# Patient Record
Sex: FEMALE | Race: WHITE | URBAN - METROPOLITAN AREA
[De-identification: names, ages, dates, MRNs, and addresses within clinical notes are randomized per-mention and may not be internally consistent; named-entity substitution may affect disease eponyms.]

---

## 2017-11-02 ENCOUNTER — HOSPITAL ENCOUNTER (OUTPATIENT)
Dept: OTHER | Age: 67
Discharge: HOME | End: 2017-11-02
Attending: INTERNAL MEDICINE | Admitting: INTERNAL MEDICINE

## 2017-11-02 DIAGNOSIS — R52 PAIN: ICD-10-CM

## 2020-02-28 ENCOUNTER — HOSPITAL ENCOUNTER (OUTPATIENT)
Dept: GENERAL RADIOLOGY | Age: 70
Discharge: HOME | End: 2020-02-28
Payer: MEDICARE

## 2020-02-28 ENCOUNTER — HOSPITAL ENCOUNTER (OUTPATIENT)
Age: 70
Discharge: HOME | End: 2020-02-28
Payer: MEDICARE

## 2020-02-28 PROCEDURE — 73090 X-RAY EXAM OF FOREARM: CPT

## 2020-02-28 PROCEDURE — 73110 X-RAY EXAM OF WRIST: CPT

## 2020-07-20 ENCOUNTER — OFFICE VISIT (OUTPATIENT)
Dept: PRIMARY CARE CLINIC | Age: 70
End: 2020-07-20
Payer: MEDICARE

## 2020-07-20 PROCEDURE — 99211 OFF/OP EST MAY X REQ PHY/QHP: CPT | Performed by: NURSE PRACTITIONER

## 2020-07-20 NOTE — PATIENT INSTRUCTIONS
You have received a viral test for COVID-19. Below is education on quarantine per the CDC guidelines. For any symptoms, seek care from your PCP, call [phone] to establish care with a doctor, or go directly to an urgent care or the emergency room. Test results will take 2-7 days and will be sent to you in your [employer] account. If you test positive, you will be contacted via phone. If you test negative, the ONLY communication will be through [address] GO TO [employer] AND SIGN UP FOR [employer]  (LOWER LEFT OF THE HOME PAGE)  No test is 100%. If you have symptoms, you should follow the guidance of quarantine as previously stated. You can still be contagious if you have symptoms. Your [facility] will reach out to you if you have a positive result. They will provide you with a return to work date and note. If you were tested for a pre-op, then you should remain in quarantine until your procedure. How do I know if I need to be in quarantine? If you live in a community where COVID-19 is or might be spreading (currently, that is virtually everywhere in the [place])  Be alert for symptoms. Watch for fever, cough, shortness of breath, or other symptoms of COVID-19.  Take your temperature if symptoms develop.  Practice social distancing. Maintain 6 feet of distance from others and stay out of crowded places.  Follow CDC guidance if symptoms develop. If you feel healthy but:   Recently had close contact with a person with COVID-19 you need to Quarantine:   Stay home until 14 days after your last exposure.  Check your temperature twice a day and watch for symptoms of COVID-19.  If possible, stay away from people who are at higher-risk for getting very sick from COVID-19.   Stay Home and Monitor Your Health if you:   Have been diagnosed with COVID-19, or   Are waiting for test results, or   Have cough, fever, or shortness of breath, or symptoms of COVID-19      When You Can

## 2020-07-25 LAB
SARS-COV-2: NOT DETECTED
SOURCE: NORMAL

## 2021-07-02 ENCOUNTER — HOSPITAL ENCOUNTER (EMERGENCY)
Age: 71
Discharge: HOME | End: 2021-07-02
Attending: STUDENT IN AN ORGANIZED HEALTH CARE EDUCATION/TRAINING PROGRAM
Payer: MEDICARE

## 2021-07-02 VITALS
WEIGHT: 192 LBS | DIASTOLIC BLOOD PRESSURE: 60 MMHG | SYSTOLIC BLOOD PRESSURE: 140 MMHG | RESPIRATION RATE: 15 BRPM | TEMPERATURE: 98.9 F | OXYGEN SATURATION: 99 % | HEIGHT: 63 IN | HEART RATE: 79 BPM | BODY MASS INDEX: 34.02 KG/M2

## 2021-07-02 DIAGNOSIS — R89.2 DRUG LEVEL ABOVE THERAPEUTIC RANGE: Primary | ICD-10-CM

## 2021-07-02 LAB
A/G RATIO: 1.3 (ref 1.1–2.2)
ALBUMIN SERPL-MCNC: 4 G/DL (ref 3.4–5)
ALP BLD-CCNC: 127 U/L (ref 40–129)
ALT SERPL-CCNC: 20 U/L (ref 10–40)
ANION GAP SERPL CALCULATED.3IONS-SCNC: 14 MMOL/L (ref 3–16)
AST SERPL-CCNC: 22 U/L (ref 15–37)
BASE EXCESS VENOUS: 2.7 MMOL/L (ref -3–3)
BASOPHILS ABSOLUTE: 0.1 K/UL (ref 0–0.2)
BASOPHILS RELATIVE PERCENT: 0.8 %
BILIRUB SERPL-MCNC: 0.4 MG/DL (ref 0–1)
BUN BLDV-MCNC: 11 MG/DL (ref 7–20)
CALCIUM SERPL-MCNC: 9.6 MG/DL (ref 8.3–10.6)
CARBOXYHEMOGLOBIN: 2.5 % (ref 0–1.5)
CHLORIDE BLD-SCNC: 98 MMOL/L (ref 99–110)
CO2: 24 MMOL/L (ref 21–32)
CREAT SERPL-MCNC: 1.1 MG/DL (ref 0.6–1.2)
EOSINOPHILS ABSOLUTE: 0.3 K/UL (ref 0–0.6)
EOSINOPHILS RELATIVE PERCENT: 2.6 %
GFR AFRICAN AMERICAN: 59
GFR NON-AFRICAN AMERICAN: 49
GLOBULIN: 3.1 G/DL
GLUCOSE BLD-MCNC: 95 MG/DL (ref 70–99)
HCO3 VENOUS: 28.5 MMOL/L (ref 23–29)
HCT VFR BLD CALC: 40.9 % (ref 36–48)
HEMOGLOBIN, VEN, REDUCED: 16 %
HEMOGLOBIN: 13.7 G/DL (ref 12–16)
LYMPHOCYTES ABSOLUTE: 1.5 K/UL (ref 1–5.1)
LYMPHOCYTES RELATIVE PERCENT: 15.3 %
MAGNESIUM: 2 MG/DL (ref 1.8–2.4)
MCH RBC QN AUTO: 31.2 PG (ref 26–34)
MCHC RBC AUTO-ENTMCNC: 33.5 G/DL (ref 31–36)
MCV RBC AUTO: 93 FL (ref 80–100)
METHEMOGLOBIN VENOUS: 0.1 %
MONOCYTES ABSOLUTE: 0.6 K/UL (ref 0–1.3)
MONOCYTES RELATIVE PERCENT: 5.7 %
NEUTROPHILS ABSOLUTE: 7.3 K/UL (ref 1.7–7.7)
NEUTROPHILS RELATIVE PERCENT: 75.6 %
O2 CONTENT, VEN: 16 VOL %
O2 SAT, VEN: 84 %
O2 THERAPY: ABNORMAL
PCO2, VEN: 47.4 MMHG (ref 40–50)
PDW BLD-RTO: 14.2 % (ref 12.4–15.4)
PH VENOUS: 7.39 (ref 7.35–7.45)
PLATELET # BLD: 355 K/UL (ref 135–450)
PMV BLD AUTO: 8.1 FL (ref 5–10.5)
PO2, VEN: 50 MMHG (ref 25–40)
POTASSIUM REFLEX MAGNESIUM: 3.3 MMOL/L (ref 3.5–5.1)
RBC # BLD: 4.4 M/UL (ref 4–5.2)
SODIUM BLD-SCNC: 136 MMOL/L (ref 136–145)
TCO2 CALC VENOUS: 67 MMOL/L
TOTAL PROTEIN: 7.1 G/DL (ref 6.4–8.2)
TROPONIN: <0.01 NG/ML
WBC # BLD: 9.7 K/UL (ref 4–11)

## 2021-07-02 PROCEDURE — 2500000003 HC RX 250 WO HCPCS: Performed by: STUDENT IN AN ORGANIZED HEALTH CARE EDUCATION/TRAINING PROGRAM

## 2021-07-02 PROCEDURE — 36415 COLL VENOUS BLD VENIPUNCTURE: CPT

## 2021-07-02 PROCEDURE — 84484 ASSAY OF TROPONIN QUANT: CPT

## 2021-07-02 PROCEDURE — 93005 ELECTROCARDIOGRAM TRACING: CPT | Performed by: PHYSICIAN ASSISTANT

## 2021-07-02 PROCEDURE — G0480 DRUG TEST DEF 1-7 CLASSES: HCPCS

## 2021-07-02 PROCEDURE — 99283 EMERGENCY DEPT VISIT LOW MDM: CPT

## 2021-07-02 PROCEDURE — 85025 COMPLETE CBC W/AUTO DIFF WBC: CPT

## 2021-07-02 PROCEDURE — 80053 COMPREHEN METABOLIC PANEL: CPT

## 2021-07-02 PROCEDURE — 83735 ASSAY OF MAGNESIUM: CPT

## 2021-07-02 PROCEDURE — 96374 THER/PROPH/DIAG INJ IV PUSH: CPT

## 2021-07-02 PROCEDURE — 82803 BLOOD GASES ANY COMBINATION: CPT

## 2021-07-02 RX ORDER — LEVOTHYROXINE SODIUM 0.07 MG/1
75 TABLET ORAL DAILY
COMMUNITY
End: 2021-12-21

## 2021-07-02 RX ORDER — POTASSIUM CHLORIDE 1.5 G/1.77G
20 POWDER, FOR SOLUTION ORAL 2 TIMES DAILY
COMMUNITY
End: 2022-02-09

## 2021-07-02 RX ORDER — HYDROCHLOROTHIAZIDE 25 MG/1
25 TABLET ORAL DAILY
COMMUNITY

## 2021-07-02 RX ORDER — M-VIT,TX,IRON,MINS/CALC/FOLIC 27MG-0.4MG
1 TABLET ORAL DAILY
COMMUNITY

## 2021-07-02 RX ORDER — CALCIUM CARBONATE 500(1250)
500 TABLET ORAL DAILY
COMMUNITY
End: 2021-12-21

## 2021-07-02 RX ADMIN — SODIUM BICARBONATE 50 MEQ: 84 INJECTION INTRAVENOUS at 18:02

## 2021-07-02 ASSESSMENT — ENCOUNTER SYMPTOMS
RESPIRATORY NEGATIVE: 1
NAUSEA: 0
PHOTOPHOBIA: 0
SHORTNESS OF BREATH: 0
BACK PAIN: 0
DIARRHEA: 0
VOMITING: 0
COLOR CHANGE: 0
CONSTIPATION: 0
ABDOMINAL PAIN: 0
COUGH: 0
CHEST TIGHTNESS: 0

## 2021-07-02 NOTE — ED NOTES
Bed: 01  Expected date:   Expected time:   Means of arrival:   Comments:  [id number] Elliott RN  07/02/21 1750

## 2021-07-02 NOTE — ED PROVIDER NOTES
I independently performed a history and physical on Pamela Nickerson. All diagnostic, treatment, and disposition decisions were made by myself in conjunction with the advanced practice provider. Briefly, this is a 70 y.o. female here for abnormal laboratory studies. She was called today by her physician for elevated despiramine level which was drawn on 6-28. She is not having any symptoms specifically no chest pain, palpitations, trouble breathing, diaphoresis or feeling faint. She has been taking despiramine for depression since 1992. She denies taking more less than prescribed. On exam pt is resting comfortably  Cardiac RRR, no murmur  Lungs clear bilaterally, no increased work of breathing  Abdomen soft nontender  No calf edema or tenderness  Neuro no drift in extremities     EKG  The Ekg interpreted by me in the absence of a cardiologist shows. normal sinus rhythm with a rate of 81  Axis is   Normal  QTc is  normal  Intervals and Durations are unremarkable. No specific ST-T wave changes appreciated. No evidence of acute ischemia. No prior for comparison     Patient presents for evaluation after incidental finding of elevated TCA level drawn 48 hours ago. Asymptomatic. Hemodynamically stable here. EKG showing no QTC or QRS prolongation. She was immediately placed on telemetry monitoring with 1 amp of bicarb given. Chart review showing TCA level greater than 2 times normal limit. Poison control was called urgently. Per their recommendations, if this was not a true trough level it is likely erroneously high. I did confirm that this is not a true trough level. This level was drawn 12 hours after her last TCA dose, not immediately prior to a TCA dose. I suspect this level is erroneously elevated and poison control agrees that the patient clinically would not present this way if this was a true trough level.   They recommend repeating the level now as this is right before her next dose would be due. She can follow-up with Dr. Susi Pires in 1 week for results and she should not take this medication again. She is agreeable to this plan. She will return to the emergency room for any new or worsening symptoms prior to her follow-up with Dr. Julisa Nelson. The total Critical Care time is 35 minutes which excludes separately billable procedures. Patient Referrals:  Ale Castrejon MD  [address] [address]  [phone]    Schedule an appointment as soon as possible for a visit   For a Re-check in   3-5   days. [facility] Emergency Department  [address] [facility]  [address] [address]  [phone]  Go to   As needed, If symptoms worsen      Discharge Medications:  Discharge Medication List as of 7/2/2021  9:17 PM          FINAL IMPRESSION  1. Drug level above therapeutic range        Blood pressure (!) 140/60, pulse 79, temperature 98.9 °F (37.2 °C), temperature source Oral, resp. rate 15, height 5' 3\" (1.6 m), weight 192 lb (87.1 kg), SpO2 99 %.      For further details of [facility] emergency department encounter, please see documentation by advanced practice provider        Valeriy Wise MD  07/02/21 2061       Valeriy Wise MD  07/02/21 2221

## 2021-07-02 NOTE — ED PROVIDER NOTES
addressed in the HPI. REVIEW OF SYSTEMS    (2-9 systems for level 4, 10 or more for level 5)     Review of Systems   Constitutional: Negative for activity change, appetite change, chills, diaphoresis, fatigue and fever. Eyes: Negative for photophobia and visual disturbance. Respiratory: Negative. Negative for cough, chest tightness and shortness of breath. Cardiovascular: Negative. Negative for chest pain, palpitations and leg swelling. Gastrointestinal: Negative for abdominal pain, constipation, diarrhea, nausea and vomiting. Genitourinary: Negative for decreased urine volume, difficulty urinating, dysuria, flank pain, frequency, hematuria and urgency. Musculoskeletal: Negative for arthralgias, back pain, myalgias, neck pain and neck stiffness. Skin: Negative for color change, pallor, rash and wound. Neurological: Negative for dizziness, tremors, seizures, syncope, facial asymmetry, speech difficulty, weakness, light-headedness, numbness and headaches. Positives and Pertinent negatives as per HPI. Except as noted above in the ROS, all other systems were reviewed and negative. PAST MEDICAL HISTORY   No past medical history on file. SURGICAL HISTORY   No past surgical history on file. CURRENTMEDICATIONS       Previous Medications    CALCIUM CARBONATE (OSCAL) 500 MG TABS TABLET    Take 500 mg by mouth daily    DESIPRAMINE HCL PO    Take by mouth    HYDROCHLOROTHIAZIDE (HYDRODIURIL) 25 MG TABLET    Take 25 mg by mouth daily    LEVOTHYROXINE (SYNTHROID) 75 MCG TABLET    Take 75 mcg by mouth Daily    MULTIPLE VITAMINS-MINERALS (THERAPEUTIC MULTIVITAMIN-MINERALS) TABLET    Take 1 tablet by mouth daily    POTASSIUM CHLORIDE (KLOR-CON) 20 MEQ PACKET    Take 20 mEq by mouth 2 times daily         ALLERGIES     Latex, Bactrim [sulfamethoxazole-trimethoprim], and Sulfa antibiotics    FAMILYHISTORY     No family history on file.        SOCIAL HISTORY       Social History     Tobacco Use  Smoking status: Not on file   Substance Use Topics    Alcohol use: Not on file    Drug use: Not on file       SCREENINGS             PHYSICAL EXAM    (up to 7 for level 4, 8 or more for level 5)     ED Triage Vitals   BP Temp Temp Source Pulse Resp SpO2 Height Weight   07/02/21 1712 07/02/21 1712 07/02/21 1712 07/02/21 1712 07/02/21 1712 07/02/21 1800 07/02/21 1712 07/02/21 1712   (!) 142/66 98.9 °F (37.2 °C) Oral 92 18 97 % 5' 3\" (1.6 m) 192 lb (87.1 kg)       Physical Exam  Constitutional:       General: She is not in acute distress. Appearance: Normal appearance. She is well-developed. She is not ill-appearing, toxic-appearing or diaphoretic. HENT:      Head: Normocephalic and atraumatic. Right Ear: External ear normal.      Left Ear: External ear normal.      Nose: Nose normal. No congestion or rhinorrhea. Mouth/Throat:      Mouth: Mucous membranes are moist.      Pharynx: No oropharyngeal exudate or posterior oropharyngeal erythema. Eyes:      General:         Right eye: No discharge. Left eye: No discharge. Extraocular Movements: Extraocular movements intact. Conjunctiva/sclera: Conjunctivae normal.      Pupils: Pupils are equal, round, and reactive to light. Cardiovascular:      Rate and Rhythm: Normal rate and regular rhythm. Pulses: Normal pulses. Heart sounds: Normal heart sounds. No murmur heard. No friction rub. No gallop. Pulmonary:      Effort: Pulmonary effort is normal. No respiratory distress. Breath sounds: Normal breath sounds. No stridor. No wheezing, rhonchi or rales. Chest:      Chest wall: No tenderness. Abdominal:      General: Abdomen is flat. Bowel sounds are normal. There is no distension. Palpations: Abdomen is soft. There is no mass. Tenderness: There is no abdominal tenderness. There is no right CVA tenderness, left CVA tenderness, guarding or rebound. Hernia: No hernia is present.    Musculoskeletal: General: Normal range of motion. Cervical back: Normal range of motion and neck supple. Skin:     General: Skin is warm and dry. Coloration: Skin is not pale. Findings: No erythema or rash. Neurological:      Mental Status: She is alert and oriented to person, place, and time. GCS: GCS eye subscore is 4. GCS verbal subscore is 5. GCS motor subscore is 6. Cranial Nerves: Cranial nerves are intact. No cranial nerve deficit, dysarthria or facial asymmetry. Sensory: Sensation is intact. No sensory deficit. Motor: Motor function is intact. Coordination: Coordination is intact. Gait: Gait is intact.  Gait normal.   Psychiatric:         Behavior: Behavior normal.         DIAGNOSTIC RESULTS   LABS:    Labs Reviewed   COMPREHENSIVE METABOLIC PANEL W/ REFLEX TO MG FOR LOW K - Abnormal; Notable for the following components:       Result Value    Potassium reflex Magnesium 3.3 (*)     Chloride 98 (*)     GFR Non- 49 (*)     GFR  61 (*)     All other components within normal limits    Narrative:     Performed at:  [facility] [address],  [place]   Phone [phone]   BLOOD GAS, VENOUS - Abnormal; Notable for the following components:    pO2, [place] 50.0 (*)     Carboxyhemoglobin 2.5 (*)     All other components within normal limits    Narrative:     Performed at:  [facility] [address] [place]   Phone [phone]   CBC WITH AUTO DIFFERENTIAL    Narrative:     Performed at:  [facility] [address]   Phone [phone]   TROPONIN    Narrative:     Performed at:  [facility] [address]   Phone [phone]   MAGNESIUM    Narrative:     Performed at:  [facility]  [address] [place] [address] [place]   Phone [phone]   Dianne Rodriguez       When ordered only abnormal lab results are displayed. All other labs were within normal range or not returned as of this dictation. EKG: When ordered, EKG's are interpreted by the Emergency Department Physician in the absence of a cardiologist.  Please see their note for interpretation of EKG. RADIOLOGY:   Non-plain film images such as CT, Ultrasound and MRI are read by the radiologist. Plain radiographic images are visualized and preliminarily interpreted by the ED Provider with the below findings:        Interpretation per the Radiologist below, if available at the time of this note:    No orders to display     No results found. PROCEDURES   Unless otherwise noted below, none     Procedures    CRITICAL CARE TIME   N/A    CONSULTS:  None      EMERGENCY DEPARTMENT COURSE and DIFFERENTIAL DIAGNOSIS/MDM:   Vitals:    Vitals:    07/02/21 1712 07/02/21 1800 07/02/21 1815 07/02/21 1830   BP: (!) 142/66 (!) 147/81 137/63 (!) 140/60   Pulse: 92 80 79 79   Resp: 18 12 16 15   Temp: 98.9 °F (37.2 °C)      TempSrc: Oral      SpO2:  97% 99% 99%   Weight: 192 lb (87.1 kg)      Height: 5' 3\" (1.6 m)          Patient was given the following medications:  Medications   sodium bicarbonate 8.4 % injection 50 mEq (50 mEq Intravenous Given 7/2/21 1802)           Patient is a 72-year-old female who presents to the ED with complaint of abnormal lab level. Patient takes desipramine has been for numerous years. Patient that she is been taking as prescribed. Denies any changes in dosages. Denies any missed or increased dosages. Patient had lab work drawn the other day and had abnormal level of this medication and sent to the ED for further evaluation treatment. There is no evidence of anticholinergic effects upon evaluation. No evidence of serotonin syndrome. Patient denies any complaints. EKG obtained interpreted by attending.   VBG shows normal pH, bicarb and CO2. CBC showed normal white count, hemoglobin and platelets. CMP relatively unremarkable with potassium 3.3. Troponin normal.  Magnesium normal.  Patient denies any complaints. Discussed with poison control. Poison control spoke with  on staff and called back.  states that this lab level is normally drawn as a trough. Poison control states the lab level should be drawn just prior to the next dose patient is scheduled to take. Patient states she normally takes the dose in the evening. States lab levels drawn around 11:00 in the morning. Patient states she normally takes her dose around 10 PM.  According to this patient had low level drawn approximately 12 hours after taking her medication when it appears to be taken around 23 to 24 hours after medication taken. Falsely elevated lab levels may be due to this. Patient again is asymptomatic at this time. Please control recommended that we could watch her for 6 hours but they states they usually do this after there was an overdose. Patient has had no overdose and has not taken the medication since last night. Patient is approximately 20 hours after her last medication dosage and is asymptomatic at this time. Do not believe any further observation at this time. Believe can be safely discharged home with close outpatient follow-up. Discussed with poison control and we will redraw level here in the emergency department given the fact the patient is approximately 22 hours after her last dosage to get a more accurate result. This lab level will be drawn and she will follow up in outpatient setting. No EKG changes. No complaints at this time. No evidence of anticholinergic effects or serotonin syndrome. Believe can be safely discharged home. Low suspicion for threat to self or others. Low suspicion for overdose. FINAL IMPRESSION      1.  Labor abnormality          DISPOSITION/PLAN   DISPOSITION Discharge - Pending Orders Complete 07/02/2021 07:59:26 PM      PATIENT REFERRED TO:  Juan Bennett MD  [address] [facility] [address]  [phone]    Schedule an appointment as soon as possible for a visit   For a Re-check in   3-5   days.     [facility] Emergency Department  [address]  [address] [facility] [address]  [phone]  Go to   As needed, If symptoms worsen      DISCHARGE MEDICATIONS:  New Prescriptions    No medications on file       DISCONTINUED MEDICATIONS:  Discontinued Medications    No medications on file              (Please note that portions of this note were completed with a voice recognition program.  Efforts were made to edit the dictations but occasionally words are mis-transcribed.)    ELANA Evans (electronically signed)          ELANA Neal  07/02/21 2004

## 2021-07-03 LAB
EKG ATRIAL RATE: 81 BPM
EKG DIAGNOSIS: NORMAL
EKG P AXIS: 63 DEGREES
EKG P-R INTERVAL: 170 MS
EKG Q-T INTERVAL: 398 MS
EKG QRS DURATION: 80 MS
EKG QTC CALCULATION (BAZETT): 462 MS
EKG R AXIS: 3 DEGREES
EKG T AXIS: 22 DEGREES
EKG VENTRICULAR RATE: 81 BPM

## 2021-07-03 PROCEDURE — 93010 ELECTROCARDIOGRAM REPORT: CPT | Performed by: INTERNAL MEDICINE

## 2021-07-06 LAB
DESIPRAMINE: 443 NG/ML (ref 100–300)
IMIPRAMINE + DESIPRAMINE TOTAL: 443 NG/ML (ref 150–300)
IMIPRAMINE: <10 NG/ML

## 2021-12-01 ENCOUNTER — OFFICE VISIT (OUTPATIENT)
Dept: RHEUMATOLOGY | Age: 71
End: 2021-12-01
Payer: MEDICARE

## 2021-12-01 VITALS
WEIGHT: 168 LBS | HEART RATE: 80 BPM | DIASTOLIC BLOOD PRESSURE: 68 MMHG | SYSTOLIC BLOOD PRESSURE: 118 MMHG | BODY MASS INDEX: 29.76 KG/M2

## 2021-12-01 DIAGNOSIS — Z11.59 ENCOUNTER FOR SCREENING FOR OTHER VIRAL DISEASES: ICD-10-CM

## 2021-12-01 DIAGNOSIS — M1A.09X0 IDIOPATHIC CHRONIC GOUT OF MULTIPLE SITES WITHOUT TOPHUS: ICD-10-CM

## 2021-12-01 DIAGNOSIS — R53.83 OTHER FATIGUE: ICD-10-CM

## 2021-12-01 DIAGNOSIS — R76.8 ANTI-RNP ANTIBODIES PRESENT: ICD-10-CM

## 2021-12-01 DIAGNOSIS — M19.90 INFLAMMATORY ARTHRITIS: Primary | ICD-10-CM

## 2021-12-01 PROCEDURE — 99205 OFFICE O/P NEW HI 60 MIN: CPT | Performed by: INTERNAL MEDICINE

## 2021-12-01 RX ORDER — COLCHICINE 0.6 MG/1
0.6 TABLET ORAL DAILY
COMMUNITY
End: 2022-04-12

## 2021-12-01 RX ORDER — SERTRALINE HYDROCHLORIDE 25 MG/1
25 TABLET, FILM COATED ORAL DAILY
COMMUNITY

## 2021-12-01 RX ORDER — PREDNISONE 20 MG/1
20 TABLET ORAL DAILY
COMMUNITY
End: 2021-12-01

## 2021-12-01 RX ORDER — PREDNISONE 1 MG/1
TABLET ORAL
Qty: 70 TABLET | Refills: 1 | Status: SHIPPED | OUTPATIENT
Start: 2021-12-01 | End: 2022-02-09

## 2021-12-01 RX ORDER — POTASSIUM BICARBONATE 25 MEQ/1
25 TABLET, EFFERVESCENT ORAL 2 TIMES DAILY
COMMUNITY
End: 2021-12-21

## 2021-12-01 RX ORDER — FLUTICASONE FUROATE 100 UG/1
1 POWDER RESPIRATORY (INHALATION) DAILY
COMMUNITY
Start: 2021-01-19

## 2021-12-01 RX ORDER — ALLOPURINOL 300 MG/1
300 TABLET ORAL DAILY
COMMUNITY

## 2021-12-01 RX ORDER — IBUPROFEN 600 MG/1
600 TABLET ORAL EVERY 6 HOURS PRN
COMMUNITY
Start: 2021-09-03 | End: 2021-12-01

## 2021-12-01 NOTE — PROGRESS NOTES
2021  Patient Name: El Adames  : 1950  Medical Record: [id number]      ASSESSMENT AND PLAN    Assessment/Plan:      ASSESSMENT:    1. Inflammatory arthritis    2. Idiopathic chronic gout of multiple sites without tophus    3. Anti-RNP antibodies present    4. Other fatigue    5. Encounter for screening for other viral diseases         PLAN:     Rosy Smith was seen today for establish care. Diagnoses and all orders for this visit:    Inflammatory arthritis  -     CBC Auto Differential; Future  -     Comprehensive Metabolic Panel; Future  -     C-Reactive Protein; Future  -     Sedimentation Rate; Future  -     Cyclic Citrul Peptide Antibody, IgG; Future  -     Hepatitis B Core Antibody, IgM; Future  -     Hepatitis B Surface Antigen; Future  -     Hepatitis C Antibody; Future  -     TSH WITH REFLEX TO FT4; Future  -     XR HAND LEFT (MIN 3 VIEWS); Future  -     XR HAND RIGHT (MIN 3 VIEWS); Future  -     XR FOOT RIGHT (MIN 3 VIEWS); Future  -     XR FOOT LEFT (MIN 3 VIEWS); Future  -     XR ANKLE RIGHT (MIN 3 VIEWS); Future  -     XR ANKLE LEFT (MIN 3 VIEWS); Future  -     predniSONE (DELTASONE) 5 MG tablet; TAKE 3 TABS DAILY FOR 10 DAYS AND THEN 2 TABS DAILY    Idiopathic chronic gout of multiple sites without tophus  -     CBC Auto Differential; Future  -     Uric Acid; Future    Anti-RNP antibodies present  -     CBC Auto Differential; Future  -     Miscellaneous Sendout 1; Future    Other fatigue  -     TSH WITH REFLEX TO FT4; Future    Encounter for screening for other viral diseases   -     Hepatitis B Surface Antigen; Future    Inflammatory arthritis-history suggestive of inflammatory arthritis, soft tissue swelling on joint exam, ESR 47, RF 11. Most likely rheumatoid arthritis versus psoriatic arthritis.   Also component of osteoarthritis contributing to the joint symptoms  I will do additional work-up to evaluate further for systemic rheumatic disease  I will also obtain bilateral hand, ankle and foot x-rays  I will start prednisone 15 mg daily for 10 days and then decrease to 10 mg daily  DMARD therapy after doing blood work    Gout-possible gout. No tophi on exam.  Had a flareup involving the left ankle and right wrist.  No new flareups. Currently on allopurinol 300 mg daily and colchicine 0.6 mg daily. Recommend continuing allopurinol and colchicine  Recheck uric acid, goal uric acid less than 6    Positive AMADOU and positive RNP antibody-positive AMADOU 1: 160 speckled pattern. Positive RNP antibody. dsDNA, anti-Sampson, SSA/SSB, anticentromere, SCL 70 -. No signs and symptoms concerning for lupus  I will repeat RNP antibody [send to [facility]]      The patient indicates understanding of these issues and agrees with the plan. Return in about 8 weeks (around 1/26/2022). The risks and benefits of my recommendations, as well as other treatment options, benefits and side effects werediscussed with the patient. All questions were answered.     I reviewed patient's history, referral documents and electronic medical records  Copy of consult note is being routedelectronically/faxed to referring physician         MEDICATIONS  Current Outpatient Medications   Medication Sig Dispense Refill    fluticasone (ARNUITY ELLIPTA) 100 MCG/ACT AEPB Inhale 1 puff into the lungs daily      colchicine (COLCRYS) 0.6 MG tablet Take 0.6 mg by mouth daily      allopurinol (ZYLOPRIM) 300 MG tablet Take 300 mg by mouth daily      Polyethylene Glycol 3350 (MIRALAX PO) Take by mouth      potassium bicarbonate (K-LYTE) 25 MEQ disintegrating tablet Take 25 mEq by mouth 2 times daily      sertraline (ZOLOFT) 25 MG tablet Take 25 mg by mouth daily      predniSONE (DELTASONE) 5 MG tablet TAKE 3 TABS DAILY FOR 10 DAYS AND THEN 2 TABS DAILY 70 tablet 1    DESIPRAMINE HCL PO Take by mouth      Multiple Vitamins-Minerals (THERAPEUTIC MULTIVITAMIN-MINERALS) tablet Take 1 tablet by mouth daily      calcium carbonate (OSCAL) 500 MG TABS tablet Take 500 mg by mouth daily      levothyroxine (SYNTHROID) 75 MCG tablet Take 75 mcg by mouth Daily      hydroCHLOROthiazide (HYDRODIURIL) 25 MG tablet Take 25 mg by mouth daily      potassium chloride (KLOR-CON) 20 MEQ packet Take 20 mEq by mouth 2 times daily       No current facility-administered medications for this visit. ALLERGIES  Allergies   Allergen Reactions    Latex     Allopurinol Rash    Duloxetine Rash    Bactrim [Sulfamethoxazole-Trimethoprim]     Sulfa Antibiotics          Comments  No specialty comments available. Tayo Burciaga MD    HISTORY OF PRESENT ILLNESS  [facility] Analilia Blackburn is a 70 y.o. female with past medical history of depression, gout, hypertension who is being seen for follow up evaluation of  joint pain. She reports pain in her joints for past several years. Pain travels around from 1 joint to another. Pain is mainly located in her hands, wrists, knees and ankles. She has swelling in the joints mainly located in the knuckles and ankles. She has morning stiffness lasting for 30 minutes. She has difficulty opening doorknobs and jars. She denies dactylitis, enthesitis, tenosynovitis, uveitis, inflammatory back pain or inflammatory bowel disease. She denies family history of rheumatoid arthritis. She denies fever, weight loss or swollen glands. She has tried taking ibuprofen/Aleve as needed in the past with some relief. She had episode of pain, swelling, erythema and tenderness of the left ankle involving the left foot. She was given a course of antibiotics for possible infection. Symptoms lasted for 2 weeks. 1 month later she had another episode involving the right wrist, right hand and right elbow. She had swelling, erythema, pain and tenderness. She went to the [facility] ER. She had right hand, right wrist and right elbow x-ray which showed joint effusion, degenerative changes.   She was given ibuprofen, codeine and steroids. Symptoms lasted for 1 week. She had blood work that showed elevated uric acid of 8.7. She was placed on allopurinol 300 mg daily and colchicine 0.6 mg daily. She has not had any flareups since. She had a work-up by PCP that showed positive AMADOU 1: 160 speckled pattern and positive RNP antibody. dsDNA, anti-Sampson, SSA/SSB, SCL 70 and anticentromere, RF came back negative. She denies malar rash, photosensitivity, nasal ulcers, dry eyes, alopecia, history OF pregnancy complications, blood clots, pleurisy or pericarditis, Raynaud's, sclerodactyly or skin thickening. Data reviewed-ER records were reviewed from September 2021 as mentioned in HPI  HPI  Review of Systems    REVIEW OF SYSTEMS: Positive for fatigue, canker sores, dry mouth, 1 miscarriage  Constitutional: No unanticipated weight loss or fevers. Integumentary: No rash, photosensitivity, malar rash, livedo reticularis, alopecia and Raynaud's symptoms, sclerodactyly, skin tightening  Eyes: negative for visual disturbance and persistent redness, discharge from eyes   ENT: - No tinnitus, loss of hearing, vertigo, or recurrent ear infections.  - No history of nasal ulcers. - No history of dry eyes  Cardiovascular: No history of pericarditis, chest pain or murmur or palpitations  Respiratory: No shortness of breath, cough or history of interstitial lung disease. No history of pleurisy. No history of tuberculosis or atypical infections. Gastrointestinal: No history of heart burn, dysphagia or esophageal dysmotility. No change in bowel habits or any inflammatory bowel disease. Genitourinary: No history of renal disease, miscarriages. Hematologic/Lymphatic: No abnormal bruising or bleeding, blood clots or swollen lymph nodes. Neurological: No history of  seizure or focal weakness.  No history of neuropathies, paresthesias or hyperesthesias, facial droop, diplopia  Psychiatric: No history of bipolar disease  Endocrine: Active Member of Clubs or Organizations: Not on file    Attends Club or Organization Meetings: Not on file    Marital Status: Not on file   Intimate Partner Violence:     Fear of Current or Ex-Partner: Not on file    Emotionally Abused: Not on file    Physically Abused: Not on file    Sexually Abused: Not on file   Housing Stability:     Unable to Pay for Housing in the Last Year: Not on file    Number of [place] in the Last Year: Not on file    Unstable Housing in the Last Year: Not on file     History reviewed. No pertinent family history.       PHYSICAL EXAM   Vitals:    12/01/21 1358   BP: 118/68   Pulse: 80   Weight: 168 lb (76.2 kg)     Physical Exam  Constitutional:  Well developed, well nourished, no acute distress, non-toxic appearance   Musculoskeletal:    RIGHT  Swell  Tender  ROM  LEFT  Swell  Tender  ROM    DIP2  0  0   [place]  0  0  FULL    DIP3  0  0  FULL   0  0  FULL    DIP4  0  0  FULL   0  0  FULL    DIP5  0  0  FULL   0  0  FULL    PIP1  + + FULL   0  0  FULL    PIP2  0  0  FULL   + + FULL    PIP3  0  0  FULL   + + FULL    PIP4  + + FULL   + + FULL    PIP5  + + FULL   + + FULL    MCP1  + + FULL   + + FULL    MCP2  ++ ++ FULL   ++  ++ FULL    MCP3  ++ ++ FULL   ++ ++ FULL    MCP4  + + FULL   + + FULL    MCP5  + + FULL   +  +  FULL    Wrist  0  0  FULL   0  0  FULL    Elbow  0  0  FULL   0  0  FULL    Shouldr  0  0  FULL   0  0  FULL    Hip  0  0  FULL   0  0  FULL    Knee  0  0   crepitus  0  0   crepitus   Ankle  ++ ++ decr   0  0  FULL    MTP1  0  0   hallux valgus  0  0   hallux valgus   MTP2  0  + FULL   0  + FULL    MTP3  0  + FULL   0  + FULL    MTP4  0  + FULL   0  + FULL    MTP5  0  + FULL   0  + FULL    IP1  0  0  FULL   0  0  FULL    IP2  0  0  FULL   0  0  FULL    IP3  0  0  FULL   0  0  FULL    IP4  0  0  FULL   0  0  FULL    IP5  0  0  FULL   0 0 FULL     Squaring and bony enlargement of bilateral CMC joints  Ambulates without assistance, normal gait  Neck: Full ROM, no tenderness,supple   Back- no tenderness. Eyes:  PERRL, extra ocular movements intact, conjunctiva normal   HEENT:  Atraumatic, normocephalic, external ears normal, oropharynx moist, no pharyngeal exudates. Respiratory:  No respiratory distress  GI:  Soft, nondistended, normal bowel sounds, nontender, noorganomegaly, no mass, no rebound, no guarding   :  No costovertebral angle tenderness   Integument:  Well hydrated, no rash or telangiectasias  Lymphatic:  No lymphadenopathy noted   Neurologic:   Alert & oriented x 3, CN 2-12 normal, no focal deficits noted. Sensations Intact. Muscle strength 5/5 proximally and distally in upper and lower extremities.    Psychiatric:  Speech and behavior appropriate           LABS AND IMAGING  Outside data reviewed and in HPI    Lab Results   Component Value Date    WBC 9.7 07/02/2021    RBC 4.40 07/02/2021    HGB 13.7 07/02/2021    HCT 40.9 07/02/2021     07/02/2021    MCV 93.0 07/02/2021    MCH 31.2 07/02/2021    MCHC 33.5 07/02/2021    RDW 14.2 07/02/2021    LYMPHOPCT 15.3 07/02/2021    MONOPCT 5.7 07/02/2021    BASOPCT 0.8 07/02/2021    MONOSABS 0.6 07/02/2021    LYMPHSABS 1.5 07/02/2021    EOSABS 0.3 07/02/2021    BASOSABS 0.1 07/02/2021       Chemistry        Component Value Date/Time     07/02/2021 1740    K 3.3 (L) 07/02/2021 1740    CL 98 (L) 07/02/2021 1740    CO2 24 07/02/2021 1740    BUN 11 07/02/2021 1740    CREATININE 1.1 07/02/2021 1740        Component Value Date/Time    CALCIUM 9.6 07/02/2021 1740    ALKPHOS 127 07/02/2021 1740    AST 22 07/02/2021 1740    ALT 20 07/02/2021 1740    BILITOT 0.4 07/02/2021 1740          Lab Results   Component Value Date    SEDRATE 47 (H) 09/07/2021     No results found for: CRP  Lab Results   Component Value Date    AMADOU POSITIVE 09/07/2021     Lab Results   Component Value Date    RF 11.0 09/07/2021     Lab Results   Component Value Date    AMADOU POSITIVE 09/07/2021    ANATITER 1:160 09/07/2021     No results found for: DSDNAG, DSDNAIGGIFA  No results found for: SSAROAB, SSALAAB  No results found for: SMAB, RNPAB  No results found for: CENTABIGG  No results found for: C3, C4, ACE  Lab Results   Component Value Date    VITD25 42.8 10/05/2020     Lab Results   Component Value Date    LABURIC 8.7 (H) 09/07/2021     Lab Results   Component Value Date    CITTOCDO52 1622 (H) 10/05/2020     Lab Results   Component Value Date    TSH 1.46 06/28/2021     Lab Results   Component Value Date    VITD25 42.8 10/05/2020       Radiology:      Time Spent With Patient:  Time spent with patient: 60 minutes  Time spent discussing diagnosis, management, reviewing medical records and treatment plan: > 30 minutes      ######################################################################    I thank you for giving me theopportunity to participate in [facility]. If you have any questions or concerns please feel free to contact me. I look forward to following  Pamela along with you. Electronically signed by: Elyssa Browning MD, MD, 12/1/2021 2:46 PM    Documentation was done using voice recognition dragon software. Every effort was made to ensure accuracy;however, inadvertent unintentional computerized transcription errors may be present.

## 2021-12-09 ENCOUNTER — HOSPITAL ENCOUNTER (OUTPATIENT)
Dept: GENERAL RADIOLOGY | Age: 71
Discharge: HOME | End: 2021-12-09
Payer: MEDICARE

## 2021-12-09 ENCOUNTER — HOSPITAL ENCOUNTER (OUTPATIENT)
Age: 71
Discharge: HOME | End: 2021-12-09
Payer: MEDICARE

## 2021-12-09 DIAGNOSIS — M19.079 OSTEOARTHRITIS OF ANKLE AND FOOT, UNSPECIFIED LATERALITY: ICD-10-CM

## 2021-12-09 DIAGNOSIS — M1A.09X0 IDIOPATHIC CHRONIC GOUT OF MULTIPLE SITES WITHOUT TOPHUS: ICD-10-CM

## 2021-12-09 DIAGNOSIS — R76.8 ANTI-RNP ANTIBODIES PRESENT: ICD-10-CM

## 2021-12-09 DIAGNOSIS — M14.679 CHARCOT'S JOINT OF FOOT, UNSPECIFIED LATERALITY: Primary | ICD-10-CM

## 2021-12-09 DIAGNOSIS — Z11.59 ENCOUNTER FOR SCREENING FOR OTHER VIRAL DISEASES: ICD-10-CM

## 2021-12-09 DIAGNOSIS — R53.83 OTHER FATIGUE: ICD-10-CM

## 2021-12-09 DIAGNOSIS — M19.90 INFLAMMATORY ARTHRITIS: ICD-10-CM

## 2021-12-09 LAB
A/G RATIO: 2.4 (ref 1.1–2.2)
ALBUMIN SERPL-MCNC: 4.7 G/DL (ref 3.4–5)
ALP BLD-CCNC: 104 U/L (ref 40–129)
ALT SERPL-CCNC: 19 U/L (ref 10–40)
ANION GAP SERPL CALCULATED.3IONS-SCNC: 12 MMOL/L (ref 3–16)
AST SERPL-CCNC: 17 U/L (ref 15–37)
BASOPHILS ABSOLUTE: 0.1 K/UL (ref 0–0.2)
BASOPHILS RELATIVE PERCENT: 0.9 %
BILIRUB SERPL-MCNC: 0.5 MG/DL (ref 0–1)
BUN BLDV-MCNC: 18 MG/DL (ref 7–20)
C-REACTIVE PROTEIN: <3 MG/L (ref 0–5.1)
CALCIUM SERPL-MCNC: 9.9 MG/DL (ref 8.3–10.6)
CHLORIDE BLD-SCNC: 99 MMOL/L (ref 99–110)
CHOLESTEROL, TOTAL: 160 MG/DL (ref 0–199)
CO2: 26 MMOL/L (ref 21–32)
CREAT SERPL-MCNC: 0.7 MG/DL (ref 0.6–1.2)
EOSINOPHILS ABSOLUTE: 0.1 K/UL (ref 0–0.6)
EOSINOPHILS RELATIVE PERCENT: 1 %
FOLATE: >20 NG/ML (ref 4.78–24.2)
GFR AFRICAN AMERICAN: >60
GFR NON-AFRICAN AMERICAN: >60
GLUCOSE BLD-MCNC: 82 MG/DL (ref 70–99)
HCT VFR BLD CALC: 39.1 % (ref 36–48)
HDLC SERPL-MCNC: 72 MG/DL (ref 40–60)
HEMOGLOBIN: 12.9 G/DL (ref 12–16)
HEPATITIS B CORE IGM ANTIBODY: NORMAL
HEPATITIS B SURFACE ANTIGEN INTERPRETATION: NORMAL
HEPATITIS C ANTIBODY INTERPRETATION: NORMAL
LDL CHOLESTEROL CALCULATED: 76 MG/DL
LYMPHOCYTES ABSOLUTE: 2.1 K/UL (ref 1–5.1)
LYMPHOCYTES RELATIVE PERCENT: 15.4 %
MCH RBC QN AUTO: 31 PG (ref 26–34)
MCHC RBC AUTO-ENTMCNC: 32.9 G/DL (ref 31–36)
MCV RBC AUTO: 94.2 FL (ref 80–100)
MONOCYTES ABSOLUTE: 1.1 K/UL (ref 0–1.3)
MONOCYTES RELATIVE PERCENT: 7.8 %
NEUTROPHILS ABSOLUTE: 10.1 K/UL (ref 1.7–7.7)
NEUTROPHILS RELATIVE PERCENT: 74.9 %
PDW BLD-RTO: 15.6 % (ref 12.4–15.4)
PLATELET # BLD: 419 K/UL (ref 135–450)
PMV BLD AUTO: 8.3 FL (ref 5–10.5)
POTASSIUM SERPL-SCNC: 3.7 MMOL/L (ref 3.5–5.1)
RBC # BLD: 4.15 M/UL (ref 4–5.2)
SEDIMENTATION RATE, ERYTHROCYTE: 7 MM/HR (ref 0–30)
SODIUM BLD-SCNC: 137 MMOL/L (ref 136–145)
T4 FREE: 2.2 NG/DL (ref 0.9–1.8)
TOTAL PROTEIN: 6.7 G/DL (ref 6.4–8.2)
TRIGL SERPL-MCNC: 62 MG/DL (ref 0–150)
TSH REFLEX FT4: 0.18 UIU/ML (ref 0.27–4.2)
URIC ACID, SERUM: 4.1 MG/DL (ref 2.6–6)
VITAMIN B-12: 726 PG/ML (ref 211–911)
VITAMIN D 25-HYDROXY: 44.6 NG/ML
VLDLC SERPL CALC-MCNC: 12 MG/DL
WBC # BLD: 13.5 K/UL (ref 4–11)

## 2021-12-09 PROCEDURE — 82607 VITAMIN B-12: CPT

## 2021-12-09 PROCEDURE — 85025 COMPLETE CBC W/AUTO DIFF WBC: CPT

## 2021-12-09 PROCEDURE — 80061 LIPID PANEL: CPT

## 2021-12-09 PROCEDURE — 73630 X-RAY EXAM OF FOOT: CPT

## 2021-12-09 PROCEDURE — 84550 ASSAY OF BLOOD/URIC ACID: CPT

## 2021-12-09 PROCEDURE — 86200 CCP ANTIBODY: CPT

## 2021-12-09 PROCEDURE — 36415 COLL VENOUS BLD VENIPUNCTURE: CPT

## 2021-12-09 PROCEDURE — 84439 ASSAY OF FREE THYROXINE: CPT

## 2021-12-09 PROCEDURE — 73610 X-RAY EXAM OF ANKLE: CPT

## 2021-12-09 PROCEDURE — 73130 X-RAY EXAM OF HAND: CPT

## 2021-12-09 PROCEDURE — 85652 RBC SED RATE AUTOMATED: CPT

## 2021-12-09 PROCEDURE — 84443 ASSAY THYROID STIM HORMONE: CPT

## 2021-12-09 PROCEDURE — 82746 ASSAY OF FOLIC ACID SERUM: CPT

## 2021-12-09 PROCEDURE — 86140 C-REACTIVE PROTEIN: CPT

## 2021-12-09 PROCEDURE — 86803 HEPATITIS C AB TEST: CPT

## 2021-12-09 PROCEDURE — 87340 HEPATITIS B SURFACE AG IA: CPT

## 2021-12-09 PROCEDURE — 82306 VITAMIN D 25 HYDROXY: CPT

## 2021-12-09 PROCEDURE — 86705 HEP B CORE ANTIBODY IGM: CPT

## 2021-12-09 PROCEDURE — 86235 NUCLEAR ANTIGEN ANTIBODY: CPT

## 2021-12-09 PROCEDURE — 80053 COMPREHEN METABOLIC PANEL: CPT

## 2021-12-10 LAB
ANTI-RNP IGG: 0.4 AI (ref 0–0.9)
CYCLIC CITRULLINATED PEPTIDE ANTIBODY IGG: <0.5 U/ML (ref 0–2.9)

## 2021-12-21 ENCOUNTER — OFFICE VISIT (OUTPATIENT)
Dept: ORTHOPEDIC SURGERY | Age: 71
End: 2021-12-21
Payer: MEDICARE

## 2021-12-21 VITALS — BODY MASS INDEX: 29.76 KG/M2 | HEIGHT: 63 IN

## 2021-12-21 DIAGNOSIS — M19.079 ANKLE ARTHRITIS: ICD-10-CM

## 2021-12-21 DIAGNOSIS — M20.21 HALLUX RIGIDUS OF RIGHT FOOT: Primary | ICD-10-CM

## 2021-12-21 PROCEDURE — 20600 DRAIN/INJ JOINT/BURSA W/O US: CPT | Performed by: ORTHOPAEDIC SURGERY

## 2021-12-21 PROCEDURE — 99203 OFFICE O/P NEW LOW 30 MIN: CPT | Performed by: ORTHOPAEDIC SURGERY

## 2021-12-21 RX ORDER — LIDOCAINE HYDROCHLORIDE 10 MG/ML
10 INJECTION, SOLUTION INFILTRATION; PERINEURAL ONCE
Status: COMPLETED | OUTPATIENT
Start: 2021-12-21 | End: 2021-12-21

## 2021-12-21 RX ORDER — TRIAMCINOLONE ACETONIDE 40 MG/ML
20 INJECTION, SUSPENSION INTRA-ARTICULAR; INTRAMUSCULAR ONCE
Status: COMPLETED | OUTPATIENT
Start: 2021-12-21 | End: 2021-12-21

## 2021-12-21 RX ADMIN — LIDOCAINE HYDROCHLORIDE 10 MG: 10 INJECTION, SOLUTION INFILTRATION; PERINEURAL at 14:12

## 2021-12-21 RX ADMIN — TRIAMCINOLONE ACETONIDE 20 MG: 40 INJECTION, SUSPENSION INTRA-ARTICULAR; INTRAMUSCULAR at 14:16

## 2021-12-21 NOTE — PROGRESS NOTES
CHIEF COMPLAINT:   1- Right great toe pain/ Hallux rigidus. 2- Right ankle pain/ advanced arthritis. HISTORY:  Ms. Pernell Lilly 70 y.o.  female presents today for consultation request from Lorena Landa MD for evaluation of bilateral R>L great toe and ankle pain which started years ago.  She is complaining of sharp pain 6/10. Pain is increase with standing and walking and shoe wear. Pain is sharp early in the morning with first few steps, dull achy pain by the end of the day. No radiation and no numbness and tingling sensation. No other complaint. No h/o trauma or gout. Past Medical History:   Diagnosis Date    Acute headache     Arthritis     Depression     Gout     Hypertension     Joint pain     Joint swelling     Muscle spasm        Past Surgical History:   Procedure Laterality Date    DENTAL SURGERY  1971    TONSILLECTOMY AND ADENOIDECTOMY  1954       Social History     Socioeconomic History    Marital status:      Spouse name: Not on file    Number of children: Not on file    Years of education: Not on file    Highest education level: Not on file   Occupational History    Not on file   Tobacco Use    Smoking status: Passive Smoke Exposure - Never Smoker    Smokeless tobacco: Never Used    Tobacco comment:  used to smoke   Vaping Use    Vaping Use: Never used   Substance and Sexual Activity    Alcohol use: Yes     Comment: Rarely    Drug use: Never    Sexual activity: Not on file   Other Topics Concern    Not on file   Social History Narrative    Not on file     Social Determinants of Health     Financial Resource Strain:     Difficulty of Paying Living Expenses: Not on file   Food Insecurity:     Worried About Running Out of Food in the Last Year: Not on file    Alexa of Food in the Last Year: Not on file   Transportation Needs:     Lack of Transportation (Medical): Not on file    Lack of Transportation (Non-Medical):  Not on file   Physical Activity:     Days of Exercise per Week: Not on file    Minutes of Exercise per Session: Not on file   Stress:     Feeling of Stress : Not on file   Social Connections:     Frequency of Communication with Friends and Family: Not on file    Frequency of Social Gatherings with Friends and Family: Not on file    Attends Jainism Services: Not on file    Active Member of Clubs or Organizations: Not on file    Attends Club or Organization Meetings: Not on file    Marital Status: Not on file   Intimate Partner Violence:     Fear of Current or Ex-Partner: Not on file    Emotionally Abused: Not on file    Physically Abused: Not on file    Sexually Abused: Not on file   Housing Stability:     Unable to Pay for Housing in the Last Year: Not on file    Number of [place] in the Last Year: Not on file    Unstable Housing in the Last Year: Not on file       History reviewed. No pertinent family history. Current Outpatient Medications on File Prior to Visit   Medication Sig Dispense Refill    fluticasone (ARNUITY ELLIPTA) 100 MCG/ACT AEPB Inhale 1 puff into the lungs daily      colchicine (COLCRYS) 0.6 MG tablet Take 0.6 mg by mouth daily      allopurinol (ZYLOPRIM) 300 MG tablet Take 300 mg by mouth daily      Polyethylene Glycol 3350 (MIRALAX PO) Take by mouth      sertraline (ZOLOFT) 25 MG tablet Take 25 mg by mouth daily      predniSONE (DELTASONE) 5 MG tablet TAKE 3 TABS DAILY FOR 10 DAYS AND THEN 2 TABS DAILY 70 tablet 1    Multiple Vitamins-Minerals (THERAPEUTIC MULTIVITAMIN-MINERALS) tablet Take 1 tablet by mouth daily      hydroCHLOROthiazide (HYDRODIURIL) 25 MG tablet Take 25 mg by mouth daily      potassium chloride (KLOR-CON) 20 MEQ packet Take 20 mEq by mouth 2 times daily       No current facility-administered medications on file prior to visit.        Pertinent items are noted in HPI  Review of systems reviewed from Patient History Form dated on 12/21/2021 and available in the patient's chart under the Media tab. No change noted. PHYSICAL EXAMINATION:  Ms. Trinidad Ortega is a very pleasant 70 y.o.  female who presents today in no acute distress, awake, alert, and oriented. She is well dressed, nourished and  groomed. Patient with normal affect. Height is  5' 3\" (1.6 m), weight is  , Body mass index is 29.76 kg/m². Resting respiratory rate is 16. Examination of the gait, showed that the patient walks heel-toe with a non-antalgic gait and no limp.  Examination of both ankles showing a good range of motion.  She has dorsiflexion to about 5 degrees right ankle, with tenderness medial joint line. She has intact sensation and good pedal pulses.  She has good strength in all four planes, including eversion, and has tenderness on deep palpation over the right great toe MPJ, with prominent dorsal osteophytes.  The ankles are stable to drawer test bilaterally, equally.  Ankle reflex 1+ bilaterally. [facility], 3 views of the bilateral foot and ankle, NWB taken 12/9/2021, and showed no acute fracture. Hallux rigidus with arthritis and dorsal osteophytes. Advanced right ankle arthritis. IMPRESSION:    1- Right great toe pain/ Hallux rigidus. 2- Right ankle pain/ advanced arthritis. PLAN: I discussed with the patient the treatment options including both surgical and non-surgical treatment. We recommended stretching exercises of the calf which was taught to the patient today. She will take NSAIDS. Use stiff sole shoes. I believe she will benefit from cortisone injection right great toe, and she agreed to have. PROCEDURE:    With the patient's permission, and under sterile condition, the right big toe MP joint area was prepared and draped with alcohol and injected with a mixture of 1 ml Kenalog 40mg and 1 ml of 1% lidocaine. The patient tolerated the procedure well.    A band-aid was applied and the patient was advised to ice the big toe for 15-20 minutes to relieve any injection site related pain. She reported a good improvement immediatly after the injection. F/u in 3-4 months with new standing xray, PT if needed. She understands that this may need surgery if the pain did not to resolve. Thank you very much for the kind consultation and allowing me to participate in this patient's care. I will continue to keep you apprised of her progress.         Caroline Lundberg MD

## 2022-02-09 ENCOUNTER — OFFICE VISIT (OUTPATIENT)
Dept: RHEUMATOLOGY | Age: 72
End: 2022-02-09
Payer: MEDICARE

## 2022-02-09 VITALS
DIASTOLIC BLOOD PRESSURE: 66 MMHG | WEIGHT: 170 LBS | SYSTOLIC BLOOD PRESSURE: 138 MMHG | HEART RATE: 104 BPM | BODY MASS INDEX: 30.11 KG/M2

## 2022-02-09 DIAGNOSIS — Z79.899 HIGH RISK MEDICATION USE: ICD-10-CM

## 2022-02-09 DIAGNOSIS — R76.8 POSITIVE ANA (ANTINUCLEAR ANTIBODY): ICD-10-CM

## 2022-02-09 DIAGNOSIS — M15.9 PRIMARY OSTEOARTHRITIS INVOLVING MULTIPLE JOINTS: ICD-10-CM

## 2022-02-09 DIAGNOSIS — M06.00 SERONEGATIVE RHEUMATOID ARTHRITIS (HCC): Primary | ICD-10-CM

## 2022-02-09 DIAGNOSIS — M1A.09X0 IDIOPATHIC CHRONIC GOUT OF MULTIPLE SITES WITHOUT TOPHUS: ICD-10-CM

## 2022-02-09 PROCEDURE — 99214 OFFICE O/P EST MOD 30 MIN: CPT | Performed by: INTERNAL MEDICINE

## 2022-02-09 RX ORDER — POTASSIUM CHLORIDE 1500 MG/1
TABLET, EXTENDED RELEASE ORAL
COMMUNITY
Start: 2021-12-16

## 2022-02-09 RX ORDER — FOLIC ACID 1 MG/1
1 TABLET ORAL DAILY
Qty: 30 TABLET | Refills: 5 | Status: SHIPPED
Start: 2022-02-09 | End: 2022-04-12 | Stop reason: SINTOL

## 2022-02-09 RX ORDER — MELOXICAM 15 MG/1
15 TABLET ORAL DAILY
Qty: 30 TABLET | Refills: 5 | Status: SHIPPED | OUTPATIENT
Start: 2022-02-09 | End: 2022-04-12 | Stop reason: ALTCHOICE

## 2022-02-09 RX ORDER — ASCORBATE CALCIUM/BIOFLAVONOID 1000-200MG
TABLET, EXTENDED RELEASE ORAL
COMMUNITY
Start: 2021-12-23

## 2022-02-09 RX ORDER — LEVOTHYROXINE SODIUM 0.05 MG/1
TABLET ORAL
COMMUNITY
Start: 2021-12-16

## 2022-02-09 RX ORDER — AMLODIPINE BESYLATE 5 MG/1
TABLET ORAL
COMMUNITY
Start: 2022-01-14

## 2022-02-09 NOTE — PATIENT INSTRUCTIONS
Labs every 4 weeks   Start methotrexate 5 tabs once a week and folic acid 1 mg daily   Start meloxicam 15 mg daily

## 2022-02-09 NOTE — PROGRESS NOTES
2022  Patient Name: Sachin Sandoval  : 1950  Medical Record: [id number]      ASSESSMENT AND PLAN    Assessment/Plan:      ASSESSMENT:    1. Seronegative rheumatoid arthritis ([facility] 75.)    2. Primary osteoarthritis involving multiple joints    3. Idiopathic chronic gout of multiple sites without tophus    4. High risk medication use    5. Positive AMADOU (antinuclear antibody)        PLAN:     Rosie Monaco was seen today for follow-up. Diagnoses and all orders for this visit:    Seronegative rheumatoid arthritis ([facility]  -     methotrexate (RHEUMATREX) 2.5 MG chemo tablet; Take 5 tablets once a week. Take all the tablets at the same time  -     folic acid (FOLVITE) 1 MG tablet; Take 1 tablet by mouth daily    Primary osteoarthritis involving multiple joints  -     meloxicam (MOBIC) 15 MG tablet; Take 1 tablet by mouth daily    Idiopathic chronic gout of multiple sites without tophus    High risk medication use  -     ALT; Standing  -     AST; Standing  -     CBC Auto Differential; Standing  -     Creatinine, Serum; Standing    Positive AMADOU (antinuclear antibody)    Seronegative rheumatoid arthritis -history suggestive of inflammatory arthritis, soft tissue swelling on joint exam  RF, CCP negative, hand, foot, ankle x-rays with severe degenerative arthritis  Reports improvement with prednisone  Active synovitis on joint exam  Most likely possibility seronegative rheumatoid arthritis  I will start methotrexate 5 tablets once a week and folic acid 1 mg daily  I will also start meloxicam 15 mg daily for concomitant osteoarthritis. Gout-possible gout. No tophi on exam.    No new flareups of gout. Last uric acid 4.1. Continue allopurinol 300 mg daily and colchicine 0.6 mg daily    Positive AMADOU- positive AMADOU 1: 160 speckled pattern. Repeat RNP antibody negative. dsDNA, anti-Sampson, SSA/SSB, anticentromere, SCL 70 -.   No signs and symptoms concerning for lupus    High risk medication use-  Methotrexate increases the risk of infections, birth defects, liver toxicity, rare lung problems, probable malignancy and bone marrow toxicity and need to check the blood counts every month for first 3 months and then every 2 months and it was discussed in detail with the patient. Alcohol abstinence is advised while taking methotrexate. Labs every 4 weeks  Up-to-date with flu, pneumonia, shingles vaccine      The patient indicates understanding of these issues and agrees with the plan. Return in about 8 weeks (around 4/6/2022). The risks and benefits of my recommendations, as well as other treatment options, benefits and side effects werediscussed with the patient. All questions were answered. I reviewed patient's history, referral documents and electronic medical records  Copy of consult note is being routedelectronically/faxed to referring physician         MEDICATIONS  Current Outpatient Medications   Medication Sig Dispense Refill    amLODIPine (NORVASC) 5 MG tablet       levothyroxine (SYNTHROID) 50 MCG tablet       Multiple Minerals (CALCIUM-MAGNESIUM-ZINC) TABS       KLOR-CON M20 20 MEQ extended release tablet       methotrexate (RHEUMATREX) 2.5 MG chemo tablet Take 5 tablets once a week.   Take all the tablets at the same time 20 tablet 1    folic acid (FOLVITE) 1 MG tablet Take 1 tablet by mouth daily 30 tablet 5    meloxicam (MOBIC) 15 MG tablet Take 1 tablet by mouth daily 30 tablet 5    fluticasone (ARNUITY ELLIPTA) 100 MCG/ACT AEPB Inhale 1 puff into the lungs daily      colchicine (COLCRYS) 0.6 MG tablet Take 0.6 mg by mouth daily      allopurinol (ZYLOPRIM) 300 MG tablet Take 300 mg by mouth daily      sertraline (ZOLOFT) 25 MG tablet Take 25 mg by mouth daily      Multiple Vitamins-Minerals (THERAPEUTIC MULTIVITAMIN-MINERALS) tablet Take 1 tablet by mouth daily      hydroCHLOROthiazide (HYDRODIURIL) 25 MG tablet Take 25 mg by mouth daily       No current facility-administered medications for this visit. ALLERGIES  Allergies   Allergen Reactions    Latex     Duloxetine Rash    Bactrim [Sulfamethoxazole-Trimethoprim]     Sulfa Antibiotics          Comments  No specialty comments available. Background history:  Barbara Carroll is a 70 y.o. female with past medical history of depression, gout, hypertension who is being seen for follow up evaluation of  joint pain. She reports pain in her joints for past several years. Pain travels around from 1 joint to another. Pain is mainly located in her hands, wrists, knees and ankles. She has swelling in the joints mainly located in the knuckles and ankles. She has morning stiffness lasting for 30 minutes. She has difficulty opening doorknobs and jars. She denies dactylitis, enthesitis, tenosynovitis, uveitis, inflammatory back pain or inflammatory bowel disease. She denies family history of rheumatoid arthritis. She denies fever, weight loss or swollen glands. She has tried taking ibuprofen/Aleve as needed in the past with some relief. She had episode of pain, swelling, erythema and tenderness of the left ankle involving the left foot. She was given a course of antibiotics for possible infection. Symptoms lasted for 2 weeks. 1 month later she had another episode involving the right wrist, right hand and right elbow. She had swelling, erythema, pain and tenderness. She went to the [facility] ER. She had right hand, right wrist and right elbow x-ray which showed joint effusion, degenerative changes. She was given ibuprofen, codeine and steroids. Symptoms lasted for 1 week. She had blood work that showed elevated uric acid of 8.7. She was placed on allopurinol 300 mg daily and colchicine 0.6 mg daily. She has not had any flareups since. She had a work-up by PCP that showed positive AMADOU 1: 160 speckled pattern and positive RNP antibody. dsDNA, anti-Sampson, SSA/SSB, SCL 70 and anticentromere, RF came back negative.   She denies malar rash, photosensitivity, nasal ulcers, dry eyes, alopecia, history OF pregnancy complications, blood clots, pleurisy or pericarditis, Raynaud's, sclerodactyly or skin thickening. Data reviewed-ER records were reviewed from September 2021 as mentioned in HPI    Interim history: She presents for a follow-up of inflammatory arthritis, gout and osteoarthritis. She is off of prednisone for past 1 week. She reports significant improvement in joint pain, swelling and stiffness on prednisone. Symptoms are slightly worsened off of prednisone. She also saw foot specialist for severe degenerative arthritis in both feet. She received corticosteroid injection of the first MTP joint with minimal benefit. She denies psoriasis, inflammatory bowel disease, inflammatory back pain, dactylitis, enthesitis, tenosynovitis and uveitis. Blood work showed negative RF, CCP, hepatitis panel. ESR, CRP are both normal.  Hand, foot, ankle x-rays showed severe degenerative changes  She has not had any flareup of gout since last seen. She is on allopurinol 300 mg daily and colchicine 0.6 mg daily  Repeat RNP came back negative. She has positive AMADOU 1: 160 speckled pattern. dsDNA, anti-Sampson, RNP, SSA/SSB are negative    HPI  Review of Systems    REVIEW OF SYSTEMS: Positive for fatigue, canker sores, dry mouth, 1 miscarriage  Constitutional: No unanticipated weight loss or fevers. Integumentary: No rash, photosensitivity, malar rash, livedo reticularis, alopecia and Raynaud's symptoms, sclerodactyly, skin tightening  Eyes: negative for visual disturbance and persistent redness, discharge from eyes   ENT: - No tinnitus, loss of hearing, vertigo, or recurrent ear infections.  - No history of nasal ulcers. - No history of dry eyes  Cardiovascular: No history of pericarditis, chest pain or murmur or palpitations  Respiratory: No shortness of breath, cough or history of interstitial lung disease. No history of pleurisy.  No history of tuberculosis or atypical infections. Gastrointestinal: No history of heart burn, dysphagia or esophageal dysmotility. No change in bowel habits or any inflammatory bowel disease. Genitourinary: No history of renal disease, miscarriages. Hematologic/Lymphatic: No abnormal bruising or bleeding, blood clots or swollen lymph nodes. Neurological: No history of  seizure or focal weakness. No history of neuropathies, paresthesias or hyperesthesias, facial droop, diplopia  Psychiatric: No history of bipolar disease  Endocrine: Denies any polyuria, polydipsia and osteoporosis  Allergic/Immunologic: No nasal congestion or hives. I have reviewed patients Past medical History, Social History and Family History as mentioned in her chart and this remains unchanged fromprevious. Past Medical History:   Diagnosis Date    Acute headache     Arthritis     Depression     Gout     Hypertension     Joint pain     Joint swelling     Muscle spasm      Past Surgical History:   Procedure Laterality Date    DENTAL SURGERY  1971    TONSILLECTOMY AND ADENOIDECTOMY  1954     Social History     Socioeconomic History    Marital status:       Spouse name: Not on file    Number of children: Not on file    Years of education: Not on file    Highest education level: Not on file   Occupational History    Not on file   Tobacco Use    Smoking status: Passive Smoke Exposure - Never Smoker    Smokeless tobacco: Never Used    Tobacco comment:  used to smoke   Vaping Use    Vaping Use: Never used   Substance and Sexual Activity    Alcohol use: Yes     Comment: Rarely    Drug use: Never    Sexual activity: Not on file   Other Topics Concern    Not on file   Social History Narrative    Not on file     Social Determinants of Health     Financial Resource Strain:     Difficulty of Paying Living Expenses: Not on file   Food Insecurity:     Worried About Running Out of Food in the Last Year: Not on file    Ran Out of Food in the Last Year: Not on file   Transportation Needs:     Lack of Transportation (Medical): Not on file    Lack of Transportation (Non-Medical): Not on file   Physical Activity:     Days of Exercise per Week: Not on file    Minutes of Exercise per Session: Not on file   Stress:     Feeling of Stress : Not on file   Social Connections:     Frequency of Communication with Friends and Family: Not on file    Frequency of Social Gatherings with Friends and Family: Not on file    Attends [employer] Services: Not on file    Active Member of [address] [place] [address] [employer] Group or Organizations: Not on file    Attends Club or Organization Meetings: Not on file    Marital Status: Not on file   Intimate Partner Violence:     Fear of Current or Ex-Partner: Not on file    Emotionally Abused: Not on file    Physically Abused: Not on file    Sexually Abused: Not on file   Housing Stability:     Unable to Pay for Housing in the Last Year: Not on file    Number of [place] in the Last Year: Not on file    Unstable Housing in the Last Year: Not on file     History reviewed. No pertinent family history.       PHYSICAL EXAM   Vitals:    02/09/22 1037   BP: 138/66   Pulse: 104   Weight: 170 lb (77.1 kg)     Physical Exam  Constitutional:  Well developed, well nourished, no acute distress, non-toxic appearance   Musculoskeletal:    RIGHT  Swell  Tender  ROM  LEFT  Swell  Tender  ROM    DIP2  0  0   [place]  0  0  FULL    DIP3  0  0  FULL   0  0  FULL    DIP4  0  0  FULL   0  0  FULL    DIP5  0  0  FULL   0  0  FULL    PIP1  0 0 FULL   0  0  FULL    PIP2  0  0  FULL   0 0 FULL    PIP3  0  0  FULL   0 0 FULL    PIP4  0 0 FULL   0 0 FULL    PIP5  0 0 FULL   0 0 FULL    MCP1  0 0 FULL   0 0 FULL    MCP2  + + FULL   + + FULL    MCP3  + + FULL   + + FULL    MCP4  + + FULL   + + FULL    MCP5  0 0 FULL   0 0 FULL    Wrist  0  0  FULL   0  0  FULL    Elbow  0  0  FULL   0  0  FULL    Shouldr  0  0  FULL   0  0  FULL    Hip  0  0  FULL   0  0 FULL    Knee  0  0   crepitus  0  0   crepitus   Ankle  ++ ++ decr/bony change  0  0  FULL    MTP1  0  0   hallux valgus  0  0   hallux valgus   MTP2  0  + FULL   0  + FULL    MTP3  0  + FULL   0  + FULL    MTP4  0  + FULL   0  + FULL    MTP5  0  + FULL   0  + FULL    IP1  0  0  FULL   0  0  FULL    IP2  0  0  FULL   0  0  FULL    IP3  0  0  FULL   0  0  FULL    IP4  0  0  FULL   0  0  FULL    IP5  0  0  FULL   0 0 FULL     Squaring and bony enlargement of bilateral CMC joints  Ambulates without assistance, normal gait  Neck: Full ROM, no tenderness,supple   Back- no tenderness. Eyes:  PERRL, extra ocular movements intact, conjunctiva normal   HEENT:  Atraumatic, normocephalic, external ears normal, oropharynx moist, no pharyngeal exudates. Respiratory:  No respiratory distress  GI:  Soft, nondistended, normal bowel sounds, nontender, noorganomegaly, no mass, no rebound, no guarding   :  No costovertebral angle tenderness   Integument:  Well hydrated, no rash or telangiectasias  Lymphatic:  No lymphadenopathy noted   Neurologic:   Alert & oriented x 3, CN 2-12 normal, no focal deficits noted. Sensations Intact. Muscle strength 5/5 proximally and distally in upper and lower extremities.    Psychiatric:  Speech and behavior appropriate           LABS AND IMAGING  Outside data reviewed and in HPI    Lab Results   Component Value Date    WBC 13.5 12/09/2021    RBC 4.15 12/09/2021    HGB 12.9 12/09/2021    HCT 39.1 12/09/2021     12/09/2021    MCV 94.2 12/09/2021    MCH 31.0 12/09/2021    MCHC 32.9 12/09/2021    RDW 15.6 12/09/2021    LYMPHOPCT 15.4 12/09/2021    MONOPCT 7.8 12/09/2021    BASOPCT 0.9 12/09/2021    MONOSABS 1.1 12/09/2021    LYMPHSABS 2.1 12/09/2021    EOSABS 0.1 12/09/2021    BASOSABS 0.1 12/09/2021       Chemistry        Component Value Date/Time     12/09/2021 0925    K 3.7 12/09/2021 0925    K 3.3 (L) 07/02/2021 1740    CL 99 12/09/2021 0925    CO2 26 12/09/2021 0925    BUN 18 12/09/2021 0925    CREATININE 0.7 12/09/2021 0925        Component Value Date/Time    CALCIUM 9.9 12/09/2021 0925    ALKPHOS 104 12/09/2021 0925    AST 17 12/09/2021 0925    ALT 19 12/09/2021 0925    BILITOT 0.5 12/09/2021 0925          Lab Results   Component Value Date    SEDRATE 7 12/09/2021     Lab Results   Component Value Date    CRP <3.0 12/09/2021     Lab Results   Component Value Date    AMADOU POSITIVE 09/07/2021     Lab Results   Component Value Date    RF 11.0 09/07/2021     Lab Results   Component Value Date    AMADOU POSITIVE 09/07/2021    ANATITER 1:160 09/07/2021     No results found for: DSDNAG, DSDNAIGGIFA  No results found for: SSAROAB, SSALAAB  No results found for: SMAB, RNPAB  No results found for: CENTABIGG  No results found for: C3, C4, ACE  Lab Results   Component Value Date    VITD25 44.6 12/09/2021     Lab Results   Component Value Date    LABURIC 4.1 12/09/2021     Lab Results   Component Value Date    YPGKYDEU83 726 12/09/2021     Lab Results   Component Value Date    TSHFT4 0.18 (L) 12/09/2021    TSH 1.46 06/28/2021     Lab Results   Component Value Date    VITD25 44.6 12/09/2021       Radiology: Bilateral hand, foot, ankle x-rays 12/9/2021  Right hand:       1. Moderate 1st CMC joint osteoarthrosis. 2. No acute fracture or dislocation. 3. Subcortical cystic changes at the ulnar lunate can be seen with ulnocarpal   abutment syndrome. Left hand:       1. Moderate to severe 1st CMC joint osteoarthrosis. 2. No acute fracture or dislocation. Right ankle:       1. Severe degenerative changes of the tibiotalar joint.  Clockwise rotation   of the talar dome with irregularity.  Moderate plantar calcaneal spur. Moderate degenerative changes of the midfoot.  Remote inferolateral malleolus   avulsion fracture fragment. 2. No acute fracture or dislocation. Right foot:       1. Moderate diffuse degenerative changes as detailed above.  Diffuse   osteopenia.  Moderate plantar calcaneal spur.

## 2022-03-07 DIAGNOSIS — Z79.899 HIGH RISK MEDICATION USE: ICD-10-CM

## 2022-03-07 LAB
ALT SERPL-CCNC: 30 U/L (ref 10–40)
AST SERPL-CCNC: 29 U/L (ref 15–37)
BASOPHILS ABSOLUTE: 0 K/UL (ref 0–0.2)
BASOPHILS RELATIVE PERCENT: 0.6 %
CREAT SERPL-MCNC: 1 MG/DL (ref 0.6–1.2)
EOSINOPHILS ABSOLUTE: 0.3 K/UL (ref 0–0.6)
EOSINOPHILS RELATIVE PERCENT: 4.2 %
GFR AFRICAN AMERICAN: >60
GFR NON-AFRICAN AMERICAN: 54
HCT VFR BLD CALC: 36 % (ref 36–48)
HEMOGLOBIN: 11.9 G/DL (ref 12–16)
LYMPHOCYTES ABSOLUTE: 1.3 K/UL (ref 1–5.1)
LYMPHOCYTES RELATIVE PERCENT: 18.8 %
MCH RBC QN AUTO: 32 PG (ref 26–34)
MCHC RBC AUTO-ENTMCNC: 33.1 G/DL (ref 31–36)
MCV RBC AUTO: 96.7 FL (ref 80–100)
MONOCYTES ABSOLUTE: 0.5 K/UL (ref 0–1.3)
MONOCYTES RELATIVE PERCENT: 6.8 %
NEUTROPHILS ABSOLUTE: 4.9 K/UL (ref 1.7–7.7)
NEUTROPHILS RELATIVE PERCENT: 69.6 %
PDW BLD-RTO: 16.2 % (ref 12.4–15.4)
PLATELET # BLD: 367 K/UL (ref 135–450)
PMV BLD AUTO: 8.1 FL (ref 5–10.5)
RBC # BLD: 3.72 M/UL (ref 4–5.2)
WBC # BLD: 7 K/UL (ref 4–11)

## 2022-03-11 ENCOUNTER — TELEPHONE (OUTPATIENT)
Dept: RHEUMATOLOGY | Age: 72
End: 2022-03-11

## 2022-03-11 DIAGNOSIS — M19.90 INFLAMMATORY ARTHRITIS: ICD-10-CM

## 2022-03-11 NOTE — TELEPHONE ENCOUNTER
Patient states since starting the Methotrexate and Meloxicam she has had bad swelling in her legs and headaches. She states her oncologist ordered a doppler US of B/L lower extremities that came back normal/no clots. I advised patient to hold Methotrexate and Meloxicam to see if swelling goes down. I advised patient if swelling or headaches get worse to go to emergency room. Patient saw her oncologist and PCP this week.

## 2022-03-14 RX ORDER — PREDNISONE 1 MG/1
TABLET ORAL
Qty: 70 TABLET | Refills: 1 | OUTPATIENT
Start: 2022-03-14

## 2022-03-22 ENCOUNTER — OFFICE VISIT (OUTPATIENT)
Dept: ORTHOPEDIC SURGERY | Age: 72
End: 2022-03-22
Payer: MEDICARE

## 2022-03-22 VITALS — WEIGHT: 170 LBS | BODY MASS INDEX: 30.12 KG/M2 | HEIGHT: 63 IN

## 2022-03-22 DIAGNOSIS — M20.21 HALLUX RIGIDUS OF RIGHT FOOT: ICD-10-CM

## 2022-03-22 DIAGNOSIS — M19.079 ANKLE ARTHRITIS: Primary | ICD-10-CM

## 2022-03-22 PROCEDURE — 99214 OFFICE O/P EST MOD 30 MIN: CPT | Performed by: ORTHOPAEDIC SURGERY

## 2022-03-22 PROCEDURE — 20610 DRAIN/INJ JOINT/BURSA W/O US: CPT | Performed by: ORTHOPAEDIC SURGERY

## 2022-03-22 RX ORDER — TRIAMCINOLONE ACETONIDE 40 MG/ML
40 INJECTION, SUSPENSION INTRA-ARTICULAR; INTRAMUSCULAR ONCE
Status: COMPLETED | OUTPATIENT
Start: 2022-03-22 | End: 2022-03-22

## 2022-03-22 RX ORDER — LIDOCAINE HYDROCHLORIDE 10 MG/ML
20 INJECTION, SOLUTION INFILTRATION; PERINEURAL ONCE
Status: COMPLETED | OUTPATIENT
Start: 2022-03-22 | End: 2022-03-22

## 2022-03-22 RX ADMIN — TRIAMCINOLONE ACETONIDE 40 MG: 40 INJECTION, SUSPENSION INTRA-ARTICULAR; INTRAMUSCULAR at 13:50

## 2022-03-22 RX ADMIN — LIDOCAINE HYDROCHLORIDE 20 MG: 10 INJECTION, SOLUTION INFILTRATION; PERINEURAL at 13:49

## 2022-03-22 NOTE — PROGRESS NOTES
CHIEF COMPLAINT:   1- Right great toe pain/ Hallux rigidus. 2- Right ankle pain/ advanced arthritis. HISTORY:  Ms. Betsey Robertsing 67 y.o.  female presents today for f/u. She was seen on 12/21/2021 as a consultation request from Dilcia Pryor MD for evaluation of bilateral R>L great toe and ankle pain which started years ago. She had right great toe MPJ cortisone injection on 12/21/2021 with minimal improvement. She is complaining of sharp pain 3/10. Pain is increase with standing and walking and shoe wear. Pain is sharp early in the morning with first few steps, dull achy pain by the end of the day. No radiation and no numbness and tingling sensation. No other complaint. No h/o trauma or gout. Past Medical History:   Diagnosis Date    Acute headache     Arthritis     Depression     Gout     Hypertension     Joint pain     Joint swelling     Muscle spasm        Past Surgical History:   Procedure Laterality Date    DENTAL SURGERY  1971    TONSILLECTOMY AND ADENOIDECTOMY  1954       Social History     Socioeconomic History    Marital status:       Spouse name: Not on file    Number of children: Not on file    Years of education: Not on file    Highest education level: Not on file   Occupational History    Not on file   Tobacco Use    Smoking status: Passive Smoke Exposure - Never Smoker    Smokeless tobacco: Never Used    Tobacco comment:  used to smoke   Vaping Use    Vaping Use: Never used   Substance and Sexual Activity    Alcohol use: Yes     Comment: Rarely    Drug use: Never    Sexual activity: Not on file   Other Topics Concern    Not on file   Social History Narrative    Not on file     Social Determinants of Health     Financial Resource Strain:     Difficulty of Paying Living Expenses: Not on file   Food Insecurity:     Worried About Running Out of Food in the Last Year: Not on file    Alexa of Food in the Last Year: Not on file   Transportation Needs:     (ZYLOPRIM) 300 MG tablet Take 300 mg by mouth daily      sertraline (ZOLOFT) 25 MG tablet Take 25 mg by mouth daily      Multiple Vitamins-Minerals (THERAPEUTIC MULTIVITAMIN-MINERALS) tablet Take 1 tablet by mouth daily      hydroCHLOROthiazide (HYDRODIURIL) 25 MG tablet Take 25 mg by mouth daily       No current facility-administered medications on file prior to visit. Pertinent items are noted in HPI  Review of systems reviewed from Patient History Form dated on 12/21/2021 and available in the patient's chart under the Media tab. No change noted. PHYSICAL EXAMINATION:  Ms. Alejandra Hernandez is a very pleasant 67 y.o.  female who presents today in no acute distress, awake, alert, and oriented. She is well dressed, nourished and  groomed. Patient with normal affect. Height is  5' 3\" (1.6 m), weight is 170 lb (77.1 kg), Body mass index is 30.11 kg/m². Resting respiratory rate is 16. Examination of the gait, showed that the patient walks heel-toe with a non-antalgic gait and no limp.  Examination of both ankles showing a good range of motion.  She has dorsiflexion to about 5 degrees right ankle, with tenderness medial joint line. She has intact sensation and good pedal pulses.  She has good strength in all four planes, including eversion, and has tenderness on deep palpation over the right great toe MPJ, with prominent dorsal osteophytes.  The ankles are stable to drawer test bilaterally, equally.  Ankle reflex 1+ bilaterally. IMAGING:  Waqar Zepeda were reviewed, 3 views of the right foot and ankle, taken today in the office,and showed no acute fracture. Hallux rigidus with arthritis and dorsal osteophytes. Advanced right ankle arthritis. IMPRESSION:    1- Right great toe pain/ Hallux rigidus. 2- Right ankle pain/ advanced arthritis. PLAN: I discussed with the patient the treatment options including both surgical and non-surgical treatment.   We recommended stretching exercises of the calf which was taught to the patient today. She will take NSAIDS. Use stiff sole shoes. I believe she will benefit from cortisone injection right ankle, and she agreed to have. PROCEDURE:    With the patient's permission, and under sterile condition, the right ankle was prepared and draped with alcohol and injected with a mixture of 1 ml Kenalog 40mg and 3 ml of 1% lidocaine through the medial port area. The patient tolerated the procedure well. A band-aid was applied and the patient was advised to ice the ankle for 15-20 minutes to relieve any injection site related pain. She reported a good improvement immediatly after the injection. F/u in 3-4 months, PT if needed. She understands that this may need surgery if the pain did not to resolve.        Bonnie Duvall MD

## 2022-04-12 ENCOUNTER — OFFICE VISIT (OUTPATIENT)
Dept: RHEUMATOLOGY | Age: 72
End: 2022-04-12
Payer: MEDICARE

## 2022-04-12 VITALS — WEIGHT: 167 LBS | SYSTOLIC BLOOD PRESSURE: 124 MMHG | DIASTOLIC BLOOD PRESSURE: 70 MMHG | BODY MASS INDEX: 29.58 KG/M2

## 2022-04-12 DIAGNOSIS — R76.8 POSITIVE ANA (ANTINUCLEAR ANTIBODY): ICD-10-CM

## 2022-04-12 DIAGNOSIS — M15.9 PRIMARY OSTEOARTHRITIS INVOLVING MULTIPLE JOINTS: ICD-10-CM

## 2022-04-12 DIAGNOSIS — M06.00 SERONEGATIVE RHEUMATOID ARTHRITIS (HCC): Primary | ICD-10-CM

## 2022-04-12 DIAGNOSIS — M1A.09X0 IDIOPATHIC CHRONIC GOUT OF MULTIPLE SITES WITHOUT TOPHUS: ICD-10-CM

## 2022-04-12 PROCEDURE — 99214 OFFICE O/P EST MOD 30 MIN: CPT | Performed by: INTERNAL MEDICINE

## 2022-04-12 RX ORDER — HYDROXYCHLOROQUINE SULFATE 200 MG/1
200 TABLET, FILM COATED ORAL 2 TIMES DAILY
Qty: 60 TABLET | Refills: 2 | Status: SHIPPED | OUTPATIENT
Start: 2022-04-12 | End: 2022-07-12

## 2022-04-12 NOTE — PATIENT INSTRUCTIONS
Plaquenil  Aleve or advil as needed   Tylenol 1000 mg every 8 hours, do not exceed 3 grams daily   Continue allopurinol

## 2022-04-12 NOTE — PROGRESS NOTES
2022  Patient Name: Ling Juarez  : 1950  Medical Record: [id number]      ASSESSMENT AND PLAN    Assessment/Plan:      ASSESSMENT:    1. Seronegative rheumatoid arthritis ([facility] [address]    2. Primary osteoarthritis involving multiple joints    3. Idiopathic chronic gout of multiple sites without tophus    4. Positive AMADOU (antinuclear antibody)        PLAN:     John Corral was seen today for follow-up. Diagnoses and all orders for this visit:    Seronegative rheumatoid arthritis ([facility] [address]  -     hydroxychloroquine (PLAQUENIL) 200 MG tablet; Take 1 tablet by mouth 2 times daily    Primary osteoarthritis involving multiple joints    Idiopathic chronic gout of multiple sites without tophus    Positive AMADOU (antinuclear antibody)    Seronegative rheumatoid arthritis -history suggestive of inflammatory arthritis, soft tissue swelling on joint exam  RF, CCP negative, hand, foot, ankle x-rays with severe degenerative arthritis  Reports improvement with prednisone  Mild active synovitis on joint exam  Most likely possibility seronegative rheumatoid arthritis  Did not tolerate methotrexate and meloxicam [headache and leg swelling]  I will start Plaquenil 200 mg twice a day  Plaquenil can cause skin rash, GI issues, visual blurriness and increases the risk of retinal toxicity; recommend baseline eye/retinal exam at 3 to 6 months and then once a year. Primary osteoarthritis involving multiple joints-  Continues to be symptomatic. Not willing to take daily NSAIDs  Advised to take over-the-counter Aleve or Advil, Tylenol as needed    Gout-  No tophi on exam.    No new flareups of gout. Last uric acid 4.1. Continue allopurinol 300 mg daily   Off of colchicine    Positive AMADOU- positive AMADOU 1: 160 speckled pattern. Repeat RNP antibody negative. dsDNA, anti-Sampson, SSA/SSB, anticentromere, SCL 70 -.   No signs and symptoms concerning for lupus      The patient indicates understanding of these issues and agrees with the plan.      Return in about 3 months (around 7/12/2022). The risks and benefits of my recommendations, as well as other treatment options, benefits and side effects werediscussed with the patient. All questions were answered. MEDICATIONS  Current Outpatient Medications   Medication Sig Dispense Refill    hydroxychloroquine (PLAQUENIL) 200 MG tablet Take 1 tablet by mouth 2 times daily 60 tablet 2    amLODIPine (NORVASC) 5 MG tablet       levothyroxine (SYNTHROID) 50 MCG tablet       Multiple Minerals (CALCIUM-MAGNESIUM-ZINC) TABS       KLOR-CON M20 20 MEQ extended release tablet       fluticasone (ARNUITY ELLIPTA) 100 MCG/ACT AEPB Inhale 1 puff into the lungs daily      allopurinol (ZYLOPRIM) 300 MG tablet Take 300 mg by mouth daily      sertraline (ZOLOFT) 25 MG tablet Take 25 mg by mouth daily      Multiple Vitamins-Minerals (THERAPEUTIC MULTIVITAMIN-MINERALS) tablet Take 1 tablet by mouth daily      hydroCHLOROthiazide (HYDRODIURIL) 25 MG tablet Take 25 mg by mouth daily       No current facility-administered medications for this visit. ALLERGIES  Allergies   Allergen Reactions    Latex     Duloxetine Rash    Bactrim [Sulfamethoxazole-Trimethoprim]     Sulfa Antibiotics          Comments  No specialty comments available. Background history:  Roxie Jimenez is a 67 y.o. female with past medical history of depression, gout, hypertension who is being seen for follow up evaluation of  joint pain. She reports pain in her joints for past several years. Pain travels around from 1 joint to another. Pain is mainly located in her hands, wrists, knees and ankles. She has swelling in the joints mainly located in the knuckles and ankles. She has morning stiffness lasting for 30 minutes. She has difficulty opening doorknobs and jars. She denies dactylitis, enthesitis, tenosynovitis, uveitis, inflammatory back pain or inflammatory bowel disease.   She denies family history of rheumatoid arthritis. She denies fever, weight loss or swollen glands. She has tried taking ibuprofen/Aleve as needed in the past with some relief. She had episode of pain, swelling, erythema and tenderness of the left ankle involving the left foot. She was given a course of antibiotics for possible infection. Symptoms lasted for 2 weeks. 1 month later she had another episode involving the right wrist, right hand and right elbow. She had swelling, erythema, pain and tenderness. She went to the [facility] ER. She had right hand, right wrist and right elbow x-ray which showed joint effusion, degenerative changes. She was given ibuprofen, codeine and steroids. Symptoms lasted for 1 week. She had blood work that showed elevated uric acid of 8.7. She was placed on allopurinol 300 mg daily and colchicine 0.6 mg daily. She has not had any flareups since. She had a work-up by PCP that showed positive AMADOU 1: 160 speckled pattern and positive RNP antibody. dsDNA, anti-Sampson, SSA/SSB, SCL 70 and anticentromere, RF came back negative. She denies malar rash, photosensitivity, nasal ulcers, dry eyes, alopecia, history OF pregnancy complications, blood clots, pleurisy or pericarditis, Raynaud's, sclerodactyly or skin thickening. Data reviewed-ER records were reviewed from September 2021 as mentioned in HPI    Interim history: She presents for a follow-up of inflammatory arthritis, gout and osteoarthritis. She did not tolerate methotrexate and meloxicam due to headache and leg swelling. She continues to complain of intermittent pain, swelling and stiffness in the joints mainly in her hands, knees, ankles and feet. She denies psoriasis, inflammatory bowel disease, inflammatory back pain, dactylitis, enthesitis, tenosynovitis and uveitis. Blood work showed negative RF, CCP, hepatitis panel.   ESR, CRP are both normal.  Hand, foot, ankle x-rays showed severe degenerative changes  She has not had any flareup of gout since last seen. She is on allopurinol 300 mg daily. She stopped colchicine 5 weeks ago. Repeat RNP came back negative. She has positive AMADOU 1: 160 speckled pattern. dsDNA, anti-Sampson, RNP, SSA/SSB are negative    HPI  Review of Systems    REVIEW OF SYSTEMS: Positive for fatigue, canker sores, dry mouth, 1 miscarriage  Constitutional: No unanticipated weight loss or fevers. Integumentary: No rash, photosensitivity, malar rash, livedo reticularis, alopecia and Raynaud's symptoms, sclerodactyly, skin tightening  Eyes: negative for visual disturbance and persistent redness, discharge from eyes   ENT: - No tinnitus, loss of hearing, vertigo, or recurrent ear infections.  - No history of nasal ulcers. - No history of dry eyes  Cardiovascular: No history of pericarditis, chest pain or murmur or palpitations  Respiratory: No shortness of breath, cough or history of interstitial lung disease. No history of pleurisy. No history of tuberculosis or atypical infections. Gastrointestinal: No history of heart burn, dysphagia or esophageal dysmotility. No change in bowel habits or any inflammatory bowel disease. Genitourinary: No history of renal disease, miscarriages. Hematologic/Lymphatic: No abnormal bruising or bleeding, blood clots or swollen lymph nodes. Neurological: No history of  seizure or focal weakness. No history of neuropathies, paresthesias or hyperesthesias, facial droop, diplopia  Psychiatric: No history of bipolar disease  Endocrine: Denies any polyuria, polydipsia and osteoporosis  Allergic/Immunologic: No nasal congestion or hives. I have reviewed patients Past medical History, Social History and Family History as mentioned in her chart and this remains unchanged fromprevious.     Past Medical History:   Diagnosis Date    Acute headache     Arthritis     Depression     Gout     Hypertension     Joint pain     Joint swelling     Muscle spasm      Past Surgical History: Procedure Laterality Date    DENTAL SURGERY  1971    TONSILLECTOMY AND ADENOIDECTOMY  1954     Social History     Socioeconomic History    Marital status:      Spouse name: Not on file    Number of children: Not on file    Years of education: Not on file    Highest education level: Not on file   Occupational History    Not on file   Tobacco Use    Smoking status: Passive Smoke Exposure - Never Smoker    Smokeless tobacco: Never Used    Tobacco comment:  used to smoke   Vaping Use    Vaping Use: Never used   Substance and Sexual Activity    Alcohol use: Yes     Comment: Rarely    Drug use: Never    Sexual activity: Not on file   Other Topics Concern    Not on file   Social History Narrative    Not on file     Social Determinants of Health     Financial Resource Strain:     Difficulty of Paying Living Expenses: Not on file   Food Insecurity:     Worried About Running Out of Food in the Last Year: Not on file    Alexa of Food in the Last Year: Not on file   Transportation Needs:     Lack of Transportation (Medical): Not on file    Lack of Transportation (Non-Medical):  Not on file   Physical Activity:     Days of Exercise per Week: Not on file    Minutes of Exercise per Session: Not on file   Stress:     Feeling of Stress : Not on file   Social Connections:     Frequency of Communication with Friends and Family: Not on file    Frequency of Social Gatherings with Friends and Family: Not on file    Attends [employer] Services: Not on file    Active Member of Clubs or Organizations: Not on file    Attends Club or Organization Meetings: Not on file    Marital Status: Not on file   Intimate Partner Violence:     Fear of Current or Ex-Partner: Not on file    Emotionally Abused: Not on file    Physically Abused: Not on file    Sexually Abused: Not on file   Housing Stability:     Unable to Pay for Housing in the Last Year: Not on file    Number of [place] in the Last Year: Not on file    Unstable Housing in the Last Year: Not on file     No family history on file. PHYSICAL EXAM   Vitals:    04/12/22 1025   BP: 124/70   Site: Right Upper Arm   Position: Sitting   Cuff Size: Medium Adult   Weight: 167 lb (75.8 kg)     Physical Exam  Constitutional:  Well developed, well nourished, no acute distress, non-toxic appearance   Musculoskeletal:    RIGHT  Swell  Tender  ROM  LEFT  Swell  Tender  ROM    DIP2  0  0   [place]  0  0  FULL    DIP3  0  0  FULL   0  0  FULL    DIP4  0  0  FULL   0  0  FULL    DIP5  0  0  FULL   0  0  FULL    PIP1  0 0 FULL   0  0  FULL    PIP2  0  0  FULL   0 0 FULL    PIP3  0  0  FULL   0 0 FULL    PIP4  0 0 FULL   0 0 FULL    PIP5  0 0 FULL   0 0 FULL    MCP1  0 0 FULL   0 0 FULL    MCP2  + + FULL   + + FULL    MCP3  + + FULL   + + FULL    MCP4  + + FULL   + + FULL    MCP5  0 0 FULL   0 0 FULL    Wrist  0  0  FULL   0  0  FULL    Elbow  0  0  FULL   0  0  FULL    Shouldr  0  0  FULL   0  0  FULL    Hip  0  0  FULL   0  0  FULL    Knee  0  0   crepitus  0  0   crepitus   Ankle  ++ ++ decr/bony change  0  0  FULL    MTP1  0  0   hallux valgus  0  0   hallux valgus   MTP2  0  + FULL   0  + FULL    MTP3  0  + FULL   0  + FULL    MTP4  0  + FULL   0  + FULL    MTP5  0  + FULL   0  + FULL    IP1  0  0  FULL   0  0  FULL    IP2  0  0  FULL   0  0  FULL    IP3  0  0  FULL   0  0  FULL    IP4  0  0  FULL   0  0  FULL    IP5  0  0  FULL   0 0 FULL     Squaring and bony enlargement of bilateral CMC joints  Ambulates without assistance, normal gait  Neck: Full ROM, no tenderness,supple   Back- no tenderness. Eyes:  PERRL, extra ocular movements intact, conjunctiva normal   HEENT:  Atraumatic, normocephalic, external ears normal, oropharynx moist, no pharyngeal exudates.    Respiratory:  No respiratory distress  GI:  Soft, nondistended, normal bowel sounds, nontender, noorganomegaly, no mass, no rebound, no guarding   :  No costovertebral angle tenderness Integument:  Well hydrated, no rash or telangiectasias  Lymphatic:  No lymphadenopathy noted   Neurologic:   Alert & oriented x 3, CN 2-12 normal, no focal deficits noted. Sensations Intact. Muscle strength 5/5 proximally and distally in upper and lower extremities.    Psychiatric:  Speech and behavior appropriate           LABS AND IMAGING  Outside data reviewed and in HPI    Lab Results   Component Value Date    WBC 7.0 03/07/2022    RBC 3.72 03/07/2022    HGB 11.9 03/07/2022    HCT 36.0 03/07/2022     03/07/2022    MCV 96.7 03/07/2022    MCH 32.0 03/07/2022    MCHC 33.1 03/07/2022    RDW 16.2 03/07/2022    LYMPHOPCT 18.8 03/07/2022    MONOPCT 6.8 03/07/2022    BASOPCT 0.6 03/07/2022    MONOSABS 0.5 03/07/2022    LYMPHSABS 1.3 03/07/2022    EOSABS 0.3 03/07/2022    BASOSABS 0.0 03/07/2022       Chemistry        Component Value Date/Time     12/09/2021 0925    K 3.7 12/09/2021 0925    K 3.3 (L) 07/02/2021 1740    CL 99 12/09/2021 0925    CO2 26 12/09/2021 0925    BUN 18 12/09/2021 0925    CREATININE 1.0 03/07/2022 1022        Component Value Date/Time    CALCIUM 9.9 12/09/2021 0925    ALKPHOS 104 12/09/2021 0925    AST 29 03/07/2022 1022    ALT 30 03/07/2022 1022    BILITOT 0.5 12/09/2021 0925          Lab Results   Component Value Date    SEDRATE 7 12/09/2021     Lab Results   Component Value Date    CRP <3.0 12/09/2021     Lab Results   Component Value Date    AMADOU POSITIVE 09/07/2021     Lab Results   Component Value Date    RF 11.0 09/07/2021     Lab Results   Component Value Date    AMADOU POSITIVE 09/07/2021    ANATITER 1:160 09/07/2021     No results found for: DSDNAG, DSDNAIGGIFA  No results found for: SSAROAB, SSALAAB  No results found for: SMAB, RNPAB  No results found for: CENTABIGG  No results found for: C3, C4, ACE  Lab Results   Component Value Date    VITD25 44.6 12/09/2021     Lab Results   Component Value Date    LABURIC 4.1 12/09/2021     Lab Results   Component Value Date    Romel [address]all[address] 726 12/09/2021     Lab Results   Component Value Date    TSHFT4 0.18 (L) 12/09/2021    TSH 1.46 06/28/2021     Lab Results   Component Value Date    VITD25 44.6 12/09/2021       Radiology: Bilateral hand, foot, ankle x-rays 12/9/2021  Right hand:       1. Moderate 1st CMC joint osteoarthrosis. 2. No acute fracture or dislocation. 3. Subcortical cystic changes at the ulnar lunate can be seen with ulnocarpal   abutment syndrome. Left hand:       1. Moderate to severe 1st CMC joint osteoarthrosis. 2. No acute fracture or dislocation. Right ankle:       1. Severe degenerative changes of the tibiotalar joint.  Clockwise rotation   of the talar dome with irregularity.  Moderate plantar calcaneal spur. Moderate degenerative changes of the midfoot.  Remote inferolateral malleolus   avulsion fracture fragment. 2. No acute fracture or dislocation. Right foot:       1. Moderate diffuse degenerative changes as detailed above.  Diffuse   osteopenia.  Moderate plantar calcaneal spur.  Severe degenerative changes of   the tibiotalar joint.  Findings at the midfoot and TMT joints could represent   early neuropathic/Charcot related changes. 2. No acute fracture or dislocation. Left ankle:       1. Mild-to-moderate degenerative changes as detailed above. 2. No acute fracture or dislocation. Left foot:       1. Mild-to-moderate degenerative changes as detailed above.  Findings at the   midfoot/TMT joints could represent early neuropathic/Charcot related changes. 2. No acute fracture or dislocation.               ######################################################################    I thank you for giving me theopportunity to participate in [facility]. If you have any questions or concerns please feel free to contact me. I look forward to following  Pamela along with you.       Electronically signed by: Michael Painting MD, MD, 4/12/2022 10:48 AM    Documentation was done using voice recognition dragon software. Every effort was made to ensure accuracy;however, inadvertent unintentional computerized transcription errors may be present.

## 2022-05-05 ENCOUNTER — TELEPHONE (OUTPATIENT)
Dept: RHEUMATOLOGY | Age: 72
End: 2022-05-05

## 2022-05-05 NOTE — TELEPHONE ENCOUNTER
Patient called in stating that she has had diarrhea since starting the hydroxychloroquine x 3 weeks ago. She is also taking allopurinol for gout, wants to know if she can take pepto-bismol with those medications? Please advise. Patient would like a call back today.

## 2022-05-09 NOTE — TELEPHONE ENCOUNTER
Patient called back and I gave her message-states she has diarrhea off and on.  She will reduce her HCQ dose to 1 daily and call back on how she is feeling

## 2022-07-12 ENCOUNTER — OFFICE VISIT (OUTPATIENT)
Dept: RHEUMATOLOGY | Age: 72
End: 2022-07-12
Payer: MEDICARE

## 2022-07-12 VITALS — SYSTOLIC BLOOD PRESSURE: 122 MMHG | BODY MASS INDEX: 28.84 KG/M2 | WEIGHT: 162.8 LBS | DIASTOLIC BLOOD PRESSURE: 64 MMHG

## 2022-07-12 DIAGNOSIS — Z79.899 HIGH RISK MEDICATION USE: ICD-10-CM

## 2022-07-12 DIAGNOSIS — R76.8 POSITIVE ANA (ANTINUCLEAR ANTIBODY): ICD-10-CM

## 2022-07-12 DIAGNOSIS — M15.9 PRIMARY OSTEOARTHRITIS INVOLVING MULTIPLE JOINTS: ICD-10-CM

## 2022-07-12 DIAGNOSIS — M06.00 SERONEGATIVE RHEUMATOID ARTHRITIS (HCC): Primary | ICD-10-CM

## 2022-07-12 DIAGNOSIS — M1A.09X0 IDIOPATHIC CHRONIC GOUT OF MULTIPLE SITES WITHOUT TOPHUS: ICD-10-CM

## 2022-07-12 LAB
ALT SERPL-CCNC: 15 U/L (ref 10–40)
AST SERPL-CCNC: 18 U/L (ref 15–37)
BASOPHILS ABSOLUTE: 0.1 K/UL (ref 0–0.2)
BASOPHILS RELATIVE PERCENT: 1.5 %
CREAT SERPL-MCNC: 1 MG/DL (ref 0.6–1.2)
EOSINOPHILS ABSOLUTE: 0.3 K/UL (ref 0–0.6)
EOSINOPHILS RELATIVE PERCENT: 4.6 %
GFR AFRICAN AMERICAN: >60
GFR NON-AFRICAN AMERICAN: 54
HCT VFR BLD CALC: 37.3 % (ref 36–48)
HEMOGLOBIN: 12.5 G/DL (ref 12–16)
LYMPHOCYTES ABSOLUTE: 0.9 K/UL (ref 1–5.1)
LYMPHOCYTES RELATIVE PERCENT: 12.8 %
MCH RBC QN AUTO: 30.9 PG (ref 26–34)
MCHC RBC AUTO-ENTMCNC: 33.4 G/DL (ref 31–36)
MCV RBC AUTO: 92.4 FL (ref 80–100)
MONOCYTES ABSOLUTE: 0.4 K/UL (ref 0–1.3)
MONOCYTES RELATIVE PERCENT: 6 %
NEUTROPHILS ABSOLUTE: 5.5 K/UL (ref 1.7–7.7)
NEUTROPHILS RELATIVE PERCENT: 75.1 %
PDW BLD-RTO: 15.2 % (ref 12.4–15.4)
PLATELET # BLD: 329 K/UL (ref 135–450)
PMV BLD AUTO: 7.9 FL (ref 5–10.5)
RBC # BLD: 4.04 M/UL (ref 4–5.2)
WBC # BLD: 7.3 K/UL (ref 4–11)

## 2022-07-12 PROCEDURE — 1123F ACP DISCUSS/DSCN MKR DOCD: CPT | Performed by: INTERNAL MEDICINE

## 2022-07-12 PROCEDURE — 99214 OFFICE O/P EST MOD 30 MIN: CPT | Performed by: INTERNAL MEDICINE

## 2022-07-12 RX ORDER — LEFLUNOMIDE 20 MG/1
20 TABLET ORAL DAILY
Qty: 30 TABLET | Refills: 1 | Status: SHIPPED | OUTPATIENT
Start: 2022-07-12 | End: 2022-09-07 | Stop reason: SDUPTHER

## 2022-07-12 RX ORDER — POLYETHYLENE GLYCOL 3350 17 G/17G
POWDER, FOR SOLUTION ORAL
COMMUNITY
Start: 2022-06-06

## 2022-07-12 NOTE — PROGRESS NOTES
AMADOU- positive AMADOU 1: 160 speckled pattern. Repeat RNP antibody negative. dsDNA, anti-Sampson, SSA/SSB, anticentromere, SCL 70 -. No signs and symptoms concerning for lupus      The patient indicates understanding of these issues and agrees with the plan. Return in about 8 weeks (around 9/6/2022). The risks and benefits of my recommendations, as well as other treatment options, benefits and side effects werediscussed with the patient. All questions were answered. MEDICATIONS  Current Outpatient Medications   Medication Sig Dispense Refill    bisacodyl (DULCOLAX) 5 MG EC tablet Please follow directions for colonoscopy as per the letter sent to you.  polyethylene glycol (GLYCOLAX) 17 GM/SCOOP powder Please follow directions for colonoscopy as per the letter sent to you.  leflunomide (ARAVA) 20 MG tablet Take 1 tablet by mouth daily 30 tablet 1    amLODIPine (NORVASC) 5 MG tablet       levothyroxine (SYNTHROID) 50 MCG tablet       Multiple Minerals (CALCIUM-MAGNESIUM-ZINC) TABS       KLOR-CON M20 20 MEQ extended release tablet       fluticasone (ARNUITY ELLIPTA) 100 MCG/ACT AEPB Inhale 1 puff into the lungs daily      allopurinol (ZYLOPRIM) 300 MG tablet Take 300 mg by mouth daily      sertraline (ZOLOFT) 25 MG tablet Take 25 mg by mouth daily      Multiple Vitamins-Minerals (THERAPEUTIC MULTIVITAMIN-MINERALS) tablet Take 1 tablet by mouth daily      hydroCHLOROthiazide (HYDRODIURIL) 25 MG tablet Take 25 mg by mouth daily       No current facility-administered medications for this visit. ALLERGIES  Allergies   Allergen Reactions    Latex     Duloxetine Rash    Bactrim [Sulfamethoxazole-Trimethoprim]     Sulfa Antibiotics          Comments  No specialty comments available. Background history:  Saji Monge is a 67 y.o. female with past medical history of depression, gout, hypertension who is being seen for follow up evaluation of  joint pain.   She reports pain in and meloxicam due to headache and leg swelling. She is on Plaquenil 200 mg daily. She had diarrhea on Plaquenil. She started taking half a tablet a day and her diarrhea resolved. She is now back on Plaquenil 1 tablet a day. She continues to complain of intermittent pain, swelling and stiffness in the joints mainly in her hands, knees, ankles and feet. She denies psoriasis, inflammatory bowel disease, inflammatory back pain, dactylitis, enthesitis, tenosynovitis and uveitis. Blood work showed negative RF, CCP, hepatitis panel. ESR, CRP are both normal.  Hand, foot, ankle x-rays showed severe degenerative changes  She has not had any flareup of gout since last seen. She is on allopurinol 300 mg daily. She is off of colchicine. Repeat RNP came back negative. She has positive AMADOU 1: 160 speckled pattern. dsDNA, anti-Sampson, RNP, SSA/SSB are negative    HPI  Review of Systems    REVIEW OF SYSTEMS: Positive for fatigue, canker sores, dry mouth, 1 miscarriage  Constitutional: No unanticipated weight loss or fevers. Integumentary: No rash, photosensitivity, malar rash, livedo reticularis, alopecia and Raynaud's symptoms, sclerodactyly, skin tightening  Eyes: negative for visual disturbance and persistent redness, discharge from eyes   ENT: - No tinnitus, loss of hearing, vertigo, or recurrent ear infections.  - No history of nasal ulcers. - No history of dry eyes  Cardiovascular: No history of pericarditis, chest pain or murmur or palpitations  Respiratory: No shortness of breath, cough or history of interstitial lung disease. No history of pleurisy. No history of tuberculosis or atypical infections. Gastrointestinal: No history of heart burn, dysphagia or esophageal dysmotility. No change in bowel habits or any inflammatory bowel disease. Genitourinary: No history of renal disease, miscarriages. Hematologic/Lymphatic: No abnormal bruising or bleeding, blood clots or swollen lymph nodes.   Neurological: No history of  seizure or focal weakness. No history of neuropathies, paresthesias or hyperesthesias, facial droop, diplopia  Psychiatric: No history of bipolar disease  Endocrine: Denies any polyuria, polydipsia and osteoporosis  Allergic/Immunologic: No nasal congestion or hives. I have reviewed patients Past medical History, Social History and Family History as mentioned in her chart and this remains unchanged fromprevious. Past Medical History:   Diagnosis Date    Acute headache     Arthritis     Breast cancer ([facility] [address]     Depression     Gout     Hypertension     Joint pain     Joint swelling     Muscle spasm      Past Surgical History:   Procedure Laterality Date    BREAST LUMPECTOMY      DENTAL SURGERY  1971    TONSILLECTOMY AND ADENOIDECTOMY  1954     Social History     Socioeconomic History    Marital status:      Spouse name: Not on file    Number of children: Not on file    Years of education: Not on file    Highest education level: Not on file   Occupational History    Not on file   Tobacco Use    Smoking status: Passive Smoke Exposure - Never Smoker    Smokeless tobacco: Never Used    Tobacco comment:  used to smoke   Vaping Use    Vaping Use: Never used   Substance and Sexual Activity    Alcohol use: Yes     Comment: Rarely    Drug use: Never    Sexual activity: Not on file   Other Topics Concern    Not on file   Social History Narrative    Not on file     Social Determinants of Health     Financial Resource Strain:     Difficulty of Paying Living Expenses: Not on file   Food Insecurity:     Worried About Running Out of Food in the Last Year: Not on file    Alexa of Food in the Last Year: Not on file   Transportation Needs:     Lack of Transportation (Medical): Not on file    Lack of Transportation (Non-Medical):  Not on file   Physical Activity:     Days of Exercise per Week: Not on file    Minutes of Exercise per Session: Not on file   Stress:     0  + FULL   0  + FULL    MTP5  0  + FULL   0  + FULL    IP1  0  0  FULL   0  0  FULL    IP2  0  0  FULL   0  0  FULL    IP3  0  0  FULL   0  0  FULL    IP4  0  0  FULL   0  0  FULL    IP5  0  0  FULL   0 0 FULL     Squaring and bony enlargement of bilateral CMC joints  Ambulates without assistance, normal gait  Neck: Full ROM, no tenderness,supple   Back- no tenderness. Eyes:  PERRL, extra ocular movements intact, conjunctiva normal   HEENT:  Atraumatic, normocephalic, external ears normal, oropharynx moist, no pharyngeal exudates. Respiratory:  No respiratory distress  GI:  Soft, nondistended, normal bowel sounds, nontender, noorganomegaly, no mass, no rebound, no guarding   :  No costovertebral angle tenderness   Integument:  Well hydrated, no rash or telangiectasias  Lymphatic:  No lymphadenopathy noted   Neurologic:   Alert & oriented x 3, CN 2-12 normal, no focal deficits noted. Sensations Intact. Muscle strength 5/5 proximally and distally in upper and lower extremities.    Psychiatric:  Speech and behavior appropriate           LABS AND IMAGING  Outside data reviewed and in HPI    Lab Results   Component Value Date/Time    WBC 7.0 03/07/2022 10:22 AM    RBC 3.72 03/07/2022 10:22 AM    HGB 11.9 03/07/2022 10:22 AM    HCT 36.0 03/07/2022 10:22 AM     03/07/2022 10:22 AM    MCV 96.7 03/07/2022 10:22 AM    MCH 32.0 03/07/2022 10:22 AM    MCHC 33.1 03/07/2022 10:22 AM    RDW 16.2 03/07/2022 10:22 AM    LYMPHOPCT 18.8 03/07/2022 10:22 AM    MONOPCT 6.8 03/07/2022 10:22 AM    BASOPCT 0.6 03/07/2022 10:22 AM    MONOSABS 0.5 03/07/2022 10:22 AM    LYMPHSABS 1.3 03/07/2022 10:22 AM    EOSABS 0.3 03/07/2022 10:22 AM    BASOSABS 0.0 03/07/2022 10:22 AM       Chemistry        Component Value Date/Time     12/09/2021 0925    K 3.7 12/09/2021 0925    K 3.3 (L) 07/02/2021 1740    CL 99 12/09/2021 0925    CO2 26 12/09/2021 0925    BUN 18 12/09/2021 0925    CREATININE 1.0 03/07/2022 1022        Component Value Date/Time    CALCIUM 9.9 12/09/2021 0925    ALKPHOS 104 12/09/2021 0925    AST 29 03/07/2022 1022    ALT 30 03/07/2022 1022    BILITOT 0.5 12/09/2021 0925          Lab Results   Component Value Date    SEDRATE 7 12/09/2021     Lab Results   Component Value Date    CRP <3.0 12/09/2021     Lab Results   Component Value Date/Time    AMADOU POSITIVE 09/07/2021 03:09 PM     Lab Results   Component Value Date/Time    RF 11.0 09/07/2021 03:09 PM     Lab Results   Component Value Date/Time    AMADOU POSITIVE 09/07/2021 03:09 PM    ANATITER 1:160 09/07/2021 03:09 PM     No results found for: DSDNAG, DSDNAIGGIFA  No results found for: SSAROAB, SSALAAB  No results found for: SMAB, RNPAB  No results found for: CENTABIGG  No results found for: C3, C4, ACE  Lab Results   Component Value Date/Time    VITD25 44.6 12/09/2021 09:25 AM     Lab Results   Component Value Date    LABURIC 4.1 12/09/2021     Lab Results   Component Value Date    TDFRDTFH02 726 12/09/2021     Lab Results   Component Value Date    TSHFT4 0.18 (L) 12/09/2021    TSH 1.46 06/28/2021     Lab Results   Component Value Date    VITD25 44.6 12/09/2021       Radiology: Bilateral hand, foot, ankle x-rays 12/9/2021  Right hand:       1. Moderate 1st CMC joint osteoarthrosis. 2. No acute fracture or dislocation. 3. Subcortical cystic changes at the ulnar lunate can be seen with ulnocarpal   abutment syndrome. Left hand:       1. Moderate to severe 1st CMC joint osteoarthrosis. 2. No acute fracture or dislocation. Right ankle:       1. Severe degenerative changes of the tibiotalar joint.  Clockwise rotation   of the talar dome with irregularity.  Moderate plantar calcaneal spur. Moderate degenerative changes of the midfoot.  Remote inferolateral malleolus   avulsion fracture fragment. 2. No acute fracture or dislocation. Right foot:       1.  Moderate diffuse degenerative changes as detailed above.  Diffuse   osteopenia.  Moderate plantar calcaneal spur. Miguel Velasco degenerative changes of   the tibiotalar joint.  Findings at the midfoot and TMT joints could represent   early neuropathic/Charcot related changes. 2. No acute fracture or dislocation. Left ankle:       1. Mild-to-moderate degenerative changes as detailed above. 2. No acute fracture or dislocation. Left foot:       1. Mild-to-moderate degenerative changes as detailed above.  Findings at the   midfoot/TMT joints could represent early neuropathic/Charcot related changes. 2. No acute fracture or dislocation.               ######################################################################    I thank you for giving me theopportunity to participate in [facility] care. If you have any questions or concerns please feel free to contact me. I look forward to following  Pamela along with you. Electronically signed by: Cecile Corley MD, MD, 7/12/2022 9:19 AM    Documentation was done using voice recognition dragon software. Every effort was made to ensure accuracy;however, inadvertent unintentional computerized transcription errors may be present.

## 2022-08-11 DIAGNOSIS — Z79.899 HIGH RISK MEDICATION USE: ICD-10-CM

## 2022-08-11 LAB
BASOPHILS ABSOLUTE: 0.1 K/UL (ref 0–0.2)
BASOPHILS RELATIVE PERCENT: 2.2 %
EOSINOPHILS ABSOLUTE: 0.5 K/UL (ref 0–0.6)
EOSINOPHILS RELATIVE PERCENT: 7.8 %
HCT VFR BLD CALC: 37.7 % (ref 36–48)
HEMOGLOBIN: 12.8 G/DL (ref 12–16)
LYMPHOCYTES ABSOLUTE: 1.2 K/UL (ref 1–5.1)
LYMPHOCYTES RELATIVE PERCENT: 18.8 %
MCH RBC QN AUTO: 31.5 PG (ref 26–34)
MCHC RBC AUTO-ENTMCNC: 34 G/DL (ref 31–36)
MCV RBC AUTO: 92.5 FL (ref 80–100)
MONOCYTES ABSOLUTE: 0.5 K/UL (ref 0–1.3)
MONOCYTES RELATIVE PERCENT: 8.4 %
NEUTROPHILS ABSOLUTE: 4.1 K/UL (ref 1.7–7.7)
NEUTROPHILS RELATIVE PERCENT: 62.8 %
PDW BLD-RTO: 15.4 % (ref 12.4–15.4)
PLATELET # BLD: 301 K/UL (ref 135–450)
PMV BLD AUTO: 8.6 FL (ref 5–10.5)
RBC # BLD: 4.08 M/UL (ref 4–5.2)
WBC # BLD: 6.4 K/UL (ref 4–11)

## 2022-08-12 LAB
ALT SERPL-CCNC: 23 U/L (ref 10–40)
AST SERPL-CCNC: 22 U/L (ref 15–37)
CREAT SERPL-MCNC: 0.8 MG/DL (ref 0.6–1.2)
GFR AFRICAN AMERICAN: >60
GFR NON-AFRICAN AMERICAN: >60

## 2022-09-07 ENCOUNTER — OFFICE VISIT (OUTPATIENT)
Dept: RHEUMATOLOGY | Age: 72
End: 2022-09-07
Payer: MEDICARE

## 2022-09-07 VITALS — WEIGHT: 157.6 LBS | SYSTOLIC BLOOD PRESSURE: 140 MMHG | DIASTOLIC BLOOD PRESSURE: 72 MMHG | BODY MASS INDEX: 27.92 KG/M2

## 2022-09-07 DIAGNOSIS — M15.9 PRIMARY OSTEOARTHRITIS INVOLVING MULTIPLE JOINTS: ICD-10-CM

## 2022-09-07 DIAGNOSIS — R76.8 POSITIVE ANA (ANTINUCLEAR ANTIBODY): ICD-10-CM

## 2022-09-07 DIAGNOSIS — M06.00 SERONEGATIVE RHEUMATOID ARTHRITIS (HCC): Primary | ICD-10-CM

## 2022-09-07 DIAGNOSIS — Z79.899 HIGH RISK MEDICATION USE: ICD-10-CM

## 2022-09-07 DIAGNOSIS — M1A.09X0 IDIOPATHIC CHRONIC GOUT OF MULTIPLE SITES WITHOUT TOPHUS: ICD-10-CM

## 2022-09-07 PROCEDURE — 99214 OFFICE O/P EST MOD 30 MIN: CPT | Performed by: INTERNAL MEDICINE

## 2022-09-07 PROCEDURE — 1123F ACP DISCUSS/DSCN MKR DOCD: CPT | Performed by: INTERNAL MEDICINE

## 2022-09-07 RX ORDER — PREDNISONE 1 MG/1
5 TABLET ORAL DAILY
Qty: 30 TABLET | Refills: 2 | Status: SHIPPED | OUTPATIENT
Start: 2022-09-07

## 2022-09-07 RX ORDER — MELOXICAM 15 MG/1
15 TABLET ORAL DAILY
Qty: 30 TABLET | Refills: 2 | Status: SHIPPED | OUTPATIENT
Start: 2022-09-07

## 2022-09-07 RX ORDER — LEFLUNOMIDE 20 MG/1
20 TABLET ORAL DAILY
Qty: 90 TABLET | Refills: 0 | Status: SHIPPED | OUTPATIENT
Start: 2022-09-07

## 2022-09-07 NOTE — PROGRESS NOTES
2022  Patient Name: Irving Luque  : 1950  Medical Record: [id number]      ASSESSMENT AND PLAN    Assessment/Plan:      ASSESSMENT:    1. Seronegative rheumatoid arthritis ([facility] [address]    2. High risk medication use    3. Primary osteoarthritis involving multiple joints    4. Idiopathic chronic gout of multiple sites without tophus    5. Positive AMADOU (antinuclear antibody)        PLAN:     Roni Rivers was seen today for follow-up. Diagnoses and all orders for this visit:    Seronegative rheumatoid arthritis ([facility] [address]  -     leflunomide (ARAVA) 20 MG tablet; Take 1 tablet by mouth daily  -     predniSONE (DELTASONE) 5 MG tablet; Take 1 tablet by mouth daily    High risk medication use    Primary osteoarthritis involving multiple joints  -     meloxicam (MOBIC) 15 MG tablet; Take 1 tablet by mouth daily    Idiopathic chronic gout of multiple sites without tophus    Positive AMADOU (antinuclear antibody)  Seronegative rheumatoid arthritis -history suggestive of inflammatory arthritis, soft tissue swelling on joint exam  RF, CCP negative, hand, foot, ankle x-rays with severe degenerative arthritis  Reports improvement with prednisone  active synovitis on joint exam  Continue leflunomide 20 mg daily and start prednisone 5 mg daily. Not willing to take biologic DMARD therapy at this time. Risk versus benefit was explained in detail. Did not tolerate methotrexate  [headache and leg swelling]  No improvement with hydroxychloroquine    High risk medication use-  Leflunomide can cause nausea/vomiting, diarrhea, elevated liver enzymes, bone marrow toxicity, rare cases of interstitial lung diseases, birth defects and were explained in detail to the patient. We have to check blood work every month for first 3 months and then every 2-3 months    Primary osteoarthritis involving multiple joints-  Continues to be symptomatic.   I will restart meloxicam 15 mg daily  Over-the-counter Tylenol as needed    Gout-  No tophi on exam. No new flareups of gout. Last uric acid 4.1. Continue allopurinol 300 mg daily   Off of colchicine    Positive AMADOU- positive AMADOU 1: 160 speckled pattern. Repeat RNP antibody negative. dsDNA, anti-Sampson, SSA/SSB, anticentromere, SCL 70 -. No signs and symptoms concerning for lupus      The patient indicates understanding of these issues and agrees with the plan. Return in about 3 months (around 12/7/2022). The risks and benefits of my recommendations, as well as other treatment options, benefits and side effects werediscussed with the patient. All questions were answered. MEDICATIONS  Current Outpatient Medications   Medication Sig Dispense Refill    leflunomide (ARAVA) 20 MG tablet Take 1 tablet by mouth daily 90 tablet 0    meloxicam (MOBIC) 15 MG tablet Take 1 tablet by mouth daily 30 tablet 2    bisacodyl (DULCOLAX) 5 MG EC tablet Please follow directions for colonoscopy as per the letter sent to you. polyethylene glycol (GLYCOLAX) 17 GM/SCOOP powder Please follow directions for colonoscopy as per the letter sent to you. amLODIPine (NORVASC) 5 MG tablet       levothyroxine (SYNTHROID) 50 MCG tablet       Multiple Minerals (CALCIUM-MAGNESIUM-ZINC) TABS       KLOR-CON M20 20 MEQ extended release tablet       fluticasone (ARNUITY ELLIPTA) 100 MCG/ACT AEPB Inhale 1 puff into the lungs daily      allopurinol (ZYLOPRIM) 300 MG tablet Take 300 mg by mouth daily      sertraline (ZOLOFT) 25 MG tablet Take 25 mg by mouth daily      Multiple Vitamins-Minerals (THERAPEUTIC MULTIVITAMIN-MINERALS) tablet Take 1 tablet by mouth daily      hydroCHLOROthiazide (HYDRODIURIL) 25 MG tablet Take 25 mg by mouth daily      predniSONE (DELTASONE) 5 MG tablet Take 1 tablet by mouth daily 30 tablet 2     No current facility-administered medications for this visit.        ALLERGIES  Allergies   Allergen Reactions    Latex     Duloxetine Rash    Bactrim [Sulfamethoxazole-Trimethoprim]     Sulfa Antibiotics Comments  No specialty comments available. Background history:  Abbi Roque is a 67 y.o. female with past medical history of depression, gout, hypertension who is being seen for follow up evaluation of  joint pain. She reports pain in her joints for past several years. Pain travels around from 1 joint to another. Pain is mainly located in her hands, wrists, knees and ankles. She has swelling in the joints mainly located in the knuckles and ankles. She has morning stiffness lasting for 30 minutes. She has difficulty opening doorknobs and jars. She denies dactylitis, enthesitis, tenosynovitis, uveitis, inflammatory back pain or inflammatory bowel disease. She denies family history of rheumatoid arthritis. She denies fever, weight loss or swollen glands. She has tried taking ibuprofen/Aleve as needed in the past with some relief. She had episode of pain, swelling, erythema and tenderness of the left ankle involving the left foot. She was given a course of antibiotics for possible infection. Symptoms lasted for 2 weeks. 1 month later she had another episode involving the right wrist, right hand and right elbow. She had swelling, erythema, pain and tenderness. She went to the [facility] ER. She had right hand, right wrist and right elbow x-ray which showed joint effusion, degenerative changes. She was given ibuprofen, codeine and steroids. Symptoms lasted for 1 week. She had blood work that showed elevated uric acid of 8.7. She was placed on allopurinol 300 mg daily and colchicine 0.6 mg daily. She has not had any flareups since. She had a work-up by PCP that showed positive AMADOU 1: 160 speckled pattern and positive RNP antibody. dsDNA, anti-Sampson, SSA/SSB, SCL 70 and anticentromere, RF came back negative.   She denies malar rash, photosensitivity, nasal ulcers, dry eyes, alopecia, history OF pregnancy complications, blood clots, pleurisy or pericarditis, Raynaud's, sclerodactyly or skin thickening. Data reviewed-ER records were reviewed from September 2021 as mentioned in HPI    Interim history: She presents for a follow-up of seronegative rheumatoid arthritis, gout and osteoarthritis. She did not tolerate methotrexate and meloxicam due to headache and leg swelling. No improvement with Plaquenil. She is currently on leflunomide 20 mg daily which was started 7 weeks ago. She continues to complain of intermittent pain, swelling and stiffness in the joints mainly in her hands, knees, ankles and feet. She also takes ibuprofen as needed with some relief. She denies side effects with leflunomide. She denies recent fevers or infections. She denies psoriasis, inflammatory bowel disease, inflammatory back pain, dactylitis, enthesitis, tenosynovitis and uveitis. Blood work showed negative RF, CCP, hepatitis panel. ESR, CRP are both normal.  Hand, foot, ankle x-rays showed severe degenerative changes  She has not had any flareup of gout since last seen. She is on allopurinol 300 mg daily. She is off of colchicine. Repeat RNP came back negative. She has positive AMADOU 1: 160 speckled pattern. dsDNA, anti-Sampson, RNP, SSA/SSB are negative    HPI  Review of Systems    REVIEW OF SYSTEMS: Positive for fatigue, canker sores, dry mouth, 1 miscarriage  Constitutional: No unanticipated weight loss or fevers. Integumentary: No rash, photosensitivity, malar rash, livedo reticularis, alopecia and Raynaud's symptoms, sclerodactyly, skin tightening  Eyes: negative for visual disturbance and persistent redness, discharge from eyes   ENT: - No tinnitus, loss of hearing, vertigo, or recurrent ear infections.  - No history of nasal ulcers. - No history of dry eyes  Cardiovascular: No history of pericarditis, chest pain or murmur or palpitations  Respiratory: No shortness of breath, cough or history of interstitial lung disease. No history of pleurisy.  No history of tuberculosis or atypical infections. Gastrointestinal: No history of heart burn, dysphagia or esophageal dysmotility. No change in bowel habits or any inflammatory bowel disease. Genitourinary: No history of renal disease, miscarriages. Hematologic/Lymphatic: No abnormal bruising or bleeding, blood clots or swollen lymph nodes. Neurological: No history of  seizure or focal weakness. No history of neuropathies, paresthesias or hyperesthesias, facial droop, diplopia  Psychiatric: No history of bipolar disease  Endocrine: Denies any polyuria, polydipsia and osteoporosis  Allergic/Immunologic: No nasal congestion or hives. I have reviewed patients Past medical History, Social History and Family History as mentioned in her chart and this remains unchanged fromprevious. Past Medical History:   Diagnosis Date    Acute headache     Arthritis     Breast cancer ([facility] [address]     Depression     Gout     Hypertension     Joint pain     Joint swelling     Muscle spasm      Past Surgical History:   Procedure Laterality Date    BREAST LUMPECTOMY      [facility]     Social History     Socioeconomic History    Marital status:       Spouse name: Not on file    Number of children: Not on file    Years of education: Not on file    Highest education level: Not on file   Occupational History    Not on file   Tobacco Use    Smoking status: Never     Passive exposure: Yes    Smokeless tobacco: Never    Tobacco comments:      used to smoke   Vaping Use    Vaping Use: Never used   Substance and Sexual Activity    Alcohol use: Yes     Comment: Rarely    Drug use: Never    Sexual activity: Not on file   Other Topics Concern    Not on file   Social History Narrative    Not on file     Social Determinants of Health     Financial Resource Strain: Not on file   Food Insecurity: Not on file   Transportation Needs: Not on file   Physical Activity: Not on file   Stress: Not on file   Social Connections: Not on file   Intimate Partner Violence: Not on file   Housing Stability: Not on file     No family history on file. PHYSICAL EXAM   Vitals:    09/07/22 1035   BP: (!) 140/72   Site: Right Upper Arm   Position: Sitting   Cuff Size: Medium Adult   Weight: 157 lb 9.6 oz (71.5 kg)     Physical Exam  Constitutional:  Well developed, well nourished, no acute distress, non-toxic appearance   Musculoskeletal:    RIGHT  Swell  Tender  ROM  LEFT  Swell  Tender  ROM    DIP2  0  0   [place]  0  0  FULL    DIP3  0  0  FULL   0  0  FULL    DIP4  0  0  FULL   0  0  FULL    DIP5  0  0  FULL   0  0  FULL    PIP1  0 0 FULL   0  0  FULL    PIP2  + + FULL   + + FULL    PIP3  + +  FULL   + + FULL    PIP4  + + FULL   + + FULL    PIP5  + + FULL   + + FULL    MCP1  0 0 FULL   0 0 FULL    MCP2  + + Bony change  + + Bony change   MCP3  + + Bony change  + + Bony change   MCP4  0 0 FULL   + + FULL    MCP5  0 0 FULL   0 0 FULL    Wrist  0  0  FULL   0  0  FULL    Elbow  0  0  FULL   0  0  FULL    Shouldr  0  0  FULL   0  0  FULL    Hip  0  0  FULL   0  0  FULL    Knee  0  0   crepitus  0  0   crepitus   Ankle  ++ ++ decr/bony change  0  0  FULL    MTP1  0  0   hallux valgus  0  0   hallux valgus   MTP2  0  + FULL   0  + FULL    MTP3  0  + FULL   0  + FULL    MTP4  0  + FULL   0  + FULL    MTP5  0  + FULL   0  + FULL    IP1  0  0  FULL   0  0  FULL    IP2  0  0  FULL   0  0  FULL    IP3  0  0  FULL   0  0  FULL    IP4  0  0  FULL   0  0  FULL    IP5  0  0  FULL   0 0 FULL     Squaring and bony enlargement of bilateral CMC joints  Ambulates without assistance, normal gait  Neck: Full ROM, no tenderness,supple   Back- no tenderness. Eyes:  PERRL, extra ocular movements intact, conjunctiva normal   HEENT:  Atraumatic, normocephalic, external ears normal, oropharynx moist, no pharyngeal exudates.    Respiratory:  No respiratory distress  GI:  Soft, nondistended, normal bowel sounds, nontender, noorganomegaly, no mass, no rebound, no guarding   :  No costovertebral angle tenderness   Integument:  Well hydrated, no rash or telangiectasias  Lymphatic:  No lymphadenopathy noted   Neurologic:   Alert & oriented x 3, CN 2-12 normal, no focal deficits noted. Sensations Intact. Muscle strength 5/5 proximally and distally in upper and lower extremities.    Psychiatric:  Speech and behavior appropriate           LABS AND IMAGING  Outside data reviewed and in HPI    Lab Results   Component Value Date/Time    WBC 6.4 08/11/2022 09:11 AM    RBC 4.08 08/11/2022 09:11 AM    HGB 12.8 08/11/2022 09:11 AM    HCT 37.7 08/11/2022 09:11 AM     08/11/2022 09:11 AM    MCV 92.5 08/11/2022 09:11 AM    MCH 31.5 08/11/2022 09:11 AM    MCHC 34.0 08/11/2022 09:11 AM    RDW 15.4 08/11/2022 09:11 AM    LYMPHOPCT 18.8 08/11/2022 09:11 AM    MONOPCT 8.4 08/11/2022 09:11 AM    BASOPCT 2.2 08/11/2022 09:11 AM    MONOSABS 0.5 08/11/2022 09:11 AM    LYMPHSABS 1.2 08/11/2022 09:11 AM    EOSABS 0.5 08/11/2022 09:11 AM    BASOSABS 0.1 08/11/2022 09:11 AM       Chemistry        Component Value Date/Time     12/09/2021 0925    K 3.7 12/09/2021 0925    K 3.3 (L) 07/02/2021 1740    CL 99 12/09/2021 0925    CO2 26 12/09/2021 0925    BUN 18 12/09/2021 0925    CREATININE 0.8 08/11/2022 0911        Component Value Date/Time    CALCIUM 9.9 12/09/2021 0925    ALKPHOS 104 12/09/2021 0925    AST 22 08/11/2022 0911    ALT 23 08/11/2022 0911    BILITOT 0.5 12/09/2021 0925          Lab Results   Component Value Date    SEDRATE 7 12/09/2021     Lab Results   Component Value Date    CRP <3.0 12/09/2021     Lab Results   Component Value Date/Time    AMADOU POSITIVE 09/07/2021 03:09 PM     Lab Results   Component Value Date/Time    RF 11.0 09/07/2021 03:09 PM     Lab Results   Component Value Date/Time    AMADOU POSITIVE 09/07/2021 03:09 PM    ANATITER 1:160 09/07/2021 03:09 PM     No results found for: DSDNAG, DSDNAIGGIFA  No results found for: SSAROAB, SSALAAB  No results found for: SMAB, RNPAB  No results found for: CENTABIGG  No results found for: C3, C4, ACE  Lab Results   Component Value Date/Time    VITD25 44.6 12/09/2021 09:25 AM     Lab Results   Component Value Date    LABURIC 4.1 12/09/2021     Lab Results   Component Value Date    DGLHXGQJ83 726 12/09/2021     Lab Results   Component Value Date    TSHFT4 0.18 (L) 12/09/2021    TSH 1.46 06/28/2021     Lab Results   Component Value Date    VITD25 44.6 12/09/2021       Radiology: Bilateral hand, foot, ankle x-rays 12/9/2021  Right hand:       1. Moderate 1st CMC joint osteoarthrosis. 2. No acute fracture or dislocation. 3. Subcortical cystic changes at the ulnar lunate can be seen with ulnocarpal   abutment syndrome. Left hand:       1. Moderate to severe 1st CMC joint osteoarthrosis. 2. No acute fracture or dislocation. Right ankle:       1. Severe degenerative changes of the tibiotalar joint. Clockwise rotation   of the talar dome with irregularity. Moderate plantar calcaneal spur. Moderate degenerative changes of the midfoot. Remote inferolateral malleolus   avulsion fracture fragment. 2. No acute fracture or dislocation. Right foot:       1. Moderate diffuse degenerative changes as detailed above. Diffuse   osteopenia. Moderate plantar calcaneal spur. Severe degenerative changes of   the tibiotalar joint. Findings at the midfoot and TMT joints could represent   early neuropathic/Charcot related changes. 2. No acute fracture or dislocation. Left ankle:       1. Mild-to-moderate degenerative changes as detailed above. 2. No acute fracture or dislocation. Left foot:       1. Mild-to-moderate degenerative changes as detailed above. Findings at the   midfoot/TMT joints could represent early neuropathic/Charcot related changes. 2. No acute fracture or dislocation.                ######################################################################    I thank you for giving me theopportunity to participate in [facility]. If you have any questions or concerns please feel free to contact me. I look forward to following  Pamela along with you. Electronically signed by: Kelsie Moise MD, MD, 9/7/2022 11:01 AM    Documentation was done using voice recognition dragon software. Every effort was made to ensure accuracy;however, inadvertent unintentional computerized transcription errors may be present.

## 2022-09-15 DIAGNOSIS — Z79.899 HIGH RISK MEDICATION USE: ICD-10-CM

## 2022-09-15 LAB
ALT SERPL-CCNC: 18 U/L (ref 10–40)
AST SERPL-CCNC: 18 U/L (ref 15–37)
BASOPHILS ABSOLUTE: 0.1 K/UL (ref 0–0.2)
BASOPHILS RELATIVE PERCENT: 1.9 %
CREAT SERPL-MCNC: 0.8 MG/DL (ref 0.6–1.2)
EOSINOPHILS ABSOLUTE: 0.8 K/UL (ref 0–0.6)
EOSINOPHILS RELATIVE PERCENT: 10 %
GFR AFRICAN AMERICAN: >60
GFR NON-AFRICAN AMERICAN: >60
HCT VFR BLD CALC: 36.7 % (ref 36–48)
HEMOGLOBIN: 12 G/DL (ref 12–16)
LYMPHOCYTES ABSOLUTE: 1.2 K/UL (ref 1–5.1)
LYMPHOCYTES RELATIVE PERCENT: 16 %
MCH RBC QN AUTO: 30.9 PG (ref 26–34)
MCHC RBC AUTO-ENTMCNC: 32.5 G/DL (ref 31–36)
MCV RBC AUTO: 95 FL (ref 80–100)
MONOCYTES ABSOLUTE: 0.8 K/UL (ref 0–1.3)
MONOCYTES RELATIVE PERCENT: 10.9 %
NEUTROPHILS ABSOLUTE: 4.7 K/UL (ref 1.7–7.7)
NEUTROPHILS RELATIVE PERCENT: 61.2 %
PDW BLD-RTO: 14.7 % (ref 12.4–15.4)
PLATELET # BLD: 275 K/UL (ref 135–450)
PMV BLD AUTO: 8.6 FL (ref 5–10.5)
RBC # BLD: 3.87 M/UL (ref 4–5.2)
WBC # BLD: 7.7 K/UL (ref 4–11)

## 2022-10-14 DIAGNOSIS — Z79.899 HIGH RISK MEDICATION USE: ICD-10-CM

## 2022-10-14 LAB
ALT SERPL-CCNC: 15 U/L (ref 10–40)
AST SERPL-CCNC: 15 U/L (ref 15–37)
BASOPHILS ABSOLUTE: 0.1 K/UL (ref 0–0.2)
BASOPHILS RELATIVE PERCENT: 1.3 %
CREAT SERPL-MCNC: 0.9 MG/DL (ref 0.6–1.2)
EOSINOPHILS ABSOLUTE: 0.3 K/UL (ref 0–0.6)
EOSINOPHILS RELATIVE PERCENT: 5.1 %
GFR AFRICAN AMERICAN: >60
GFR NON-AFRICAN AMERICAN: >60
HCT VFR BLD CALC: 38.4 % (ref 36–48)
HEMOGLOBIN: 12.5 G/DL (ref 12–16)
LYMPHOCYTES ABSOLUTE: 1.4 K/UL (ref 1–5.1)
LYMPHOCYTES RELATIVE PERCENT: 23.6 %
MCH RBC QN AUTO: 31 PG (ref 26–34)
MCHC RBC AUTO-ENTMCNC: 32.5 G/DL (ref 31–36)
MCV RBC AUTO: 95.3 FL (ref 80–100)
MONOCYTES ABSOLUTE: 0.6 K/UL (ref 0–1.3)
MONOCYTES RELATIVE PERCENT: 9.7 %
NEUTROPHILS ABSOLUTE: 3.5 K/UL (ref 1.7–7.7)
NEUTROPHILS RELATIVE PERCENT: 60.3 %
PDW BLD-RTO: 14.7 % (ref 12.4–15.4)
PLATELET # BLD: 250 K/UL (ref 135–450)
PMV BLD AUTO: 8.4 FL (ref 5–10.5)
RBC # BLD: 4.03 M/UL (ref 4–5.2)
WBC # BLD: 5.7 K/UL (ref 4–11)

## 2022-11-10 ENCOUNTER — TELEPHONE (OUTPATIENT)
Dept: RHEUMATOLOGY | Age: 72
End: 2022-11-10

## 2022-11-10 DIAGNOSIS — M06.00 SERONEGATIVE RHEUMATOID ARTHRITIS (HCC): Primary | ICD-10-CM

## 2022-11-10 DIAGNOSIS — Z79.899 HIGH RISK MEDICATION USE: ICD-10-CM

## 2022-11-15 DIAGNOSIS — Z79.899 HIGH RISK MEDICATION USE: ICD-10-CM

## 2022-11-15 DIAGNOSIS — M06.00 SERONEGATIVE RHEUMATOID ARTHRITIS (HCC): ICD-10-CM

## 2022-11-15 LAB
ALT SERPL-CCNC: 21 U/L (ref 10–40)
AST SERPL-CCNC: 17 U/L (ref 15–37)
BASOPHILS ABSOLUTE: 0.1 K/UL (ref 0–0.2)
BASOPHILS RELATIVE PERCENT: 1.8 %
CREAT SERPL-MCNC: 1 MG/DL (ref 0.6–1.2)
EOSINOPHILS ABSOLUTE: 0.3 K/UL (ref 0–0.6)
EOSINOPHILS RELATIVE PERCENT: 4 %
GFR SERPL CREATININE-BSD FRML MDRD: 60 ML/MIN/{1.73_M2}
HCT VFR BLD CALC: 38.1 % (ref 36–48)
HEMOGLOBIN: 12.3 G/DL (ref 12–16)
LYMPHOCYTES ABSOLUTE: 1.8 K/UL (ref 1–5.1)
LYMPHOCYTES RELATIVE PERCENT: 22.9 %
MCH RBC QN AUTO: 31.2 PG (ref 26–34)
MCHC RBC AUTO-ENTMCNC: 32.4 G/DL (ref 31–36)
MCV RBC AUTO: 96.4 FL (ref 80–100)
MONOCYTES ABSOLUTE: 0.7 K/UL (ref 0–1.3)
MONOCYTES RELATIVE PERCENT: 9.2 %
NEUTROPHILS ABSOLUTE: 4.8 K/UL (ref 1.7–7.7)
NEUTROPHILS RELATIVE PERCENT: 62.1 %
PDW BLD-RTO: 15.5 % (ref 12.4–15.4)
PLATELET # BLD: 266 K/UL (ref 135–450)
PMV BLD AUTO: 9.2 FL (ref 5–10.5)
RBC # BLD: 3.96 M/UL (ref 4–5.2)
WBC # BLD: 7.7 K/UL (ref 4–11)

## 2022-12-06 ENCOUNTER — OFFICE VISIT (OUTPATIENT)
Dept: RHEUMATOLOGY | Age: 72
End: 2022-12-06
Payer: MEDICARE

## 2022-12-06 VITALS — BODY MASS INDEX: 29.05 KG/M2 | DIASTOLIC BLOOD PRESSURE: 70 MMHG | SYSTOLIC BLOOD PRESSURE: 120 MMHG | WEIGHT: 164 LBS

## 2022-12-06 DIAGNOSIS — M1A.09X0 IDIOPATHIC CHRONIC GOUT OF MULTIPLE SITES WITHOUT TOPHUS: ICD-10-CM

## 2022-12-06 DIAGNOSIS — M06.00 SERONEGATIVE RHEUMATOID ARTHRITIS (HCC): Primary | ICD-10-CM

## 2022-12-06 DIAGNOSIS — M15.9 PRIMARY OSTEOARTHRITIS INVOLVING MULTIPLE JOINTS: ICD-10-CM

## 2022-12-06 DIAGNOSIS — Z79.899 HIGH RISK MEDICATION USE: ICD-10-CM

## 2022-12-06 DIAGNOSIS — R76.8 POSITIVE ANA (ANTINUCLEAR ANTIBODY): ICD-10-CM

## 2022-12-06 PROCEDURE — 1123F ACP DISCUSS/DSCN MKR DOCD: CPT | Performed by: INTERNAL MEDICINE

## 2022-12-06 PROCEDURE — 3074F SYST BP LT 130 MM HG: CPT | Performed by: INTERNAL MEDICINE

## 2022-12-06 PROCEDURE — 3078F DIAST BP <80 MM HG: CPT | Performed by: INTERNAL MEDICINE

## 2022-12-06 PROCEDURE — 99214 OFFICE O/P EST MOD 30 MIN: CPT | Performed by: INTERNAL MEDICINE

## 2022-12-06 RX ORDER — GABAPENTIN 100 MG/1
CAPSULE ORAL
COMMUNITY
Start: 2022-11-30

## 2022-12-06 RX ORDER — LEFLUNOMIDE 20 MG/1
20 TABLET ORAL DAILY
Qty: 90 TABLET | Refills: 0 | Status: SHIPPED | OUTPATIENT
Start: 2022-12-06

## 2022-12-06 RX ORDER — MELOXICAM 15 MG/1
15 TABLET ORAL DAILY
Qty: 30 TABLET | Refills: 2 | Status: SHIPPED | OUTPATIENT
Start: 2022-12-06

## 2022-12-06 RX ORDER — PREDNISONE 1 MG/1
5 TABLET ORAL DAILY
Qty: 30 TABLET | Refills: 2 | Status: SHIPPED | OUTPATIENT
Start: 2022-12-06

## 2022-12-06 NOTE — PROGRESS NOTES
2022  Patient Name: Keira Loera  : 1950  Medical Record: [id number]      ASSESSMENT AND PLAN    Assessment/Plan:      ASSESSMENT:    1. Seronegative rheumatoid arthritis ([facility] [address]    2. Primary osteoarthritis involving multiple joints    3. High risk medication use    4. Idiopathic chronic gout of multiple sites without tophus    5. Positive AMADOU (antinuclear antibody)        PLAN:     Cheryle Seton was seen today for follow-up. Diagnoses and all orders for this visit:    Seronegative rheumatoid arthritis ([facility] [address]  -     leflunomide (ARAVA) 20 MG tablet; Take 1 tablet by mouth daily  -     predniSONE (DELTASONE) 5 MG tablet; Take 1 tablet by mouth daily    Primary osteoarthritis involving multiple joints  -     meloxicam (MOBIC) 15 MG tablet; Take 1 tablet by mouth daily    High risk medication use    Idiopathic chronic gout of multiple sites without tophus    Positive AMADOU (antinuclear antibody)  Seronegative rheumatoid arthritis -history suggestive of inflammatory arthritis, soft tissue swelling on joint exam  RF, CCP negative, hand, foot, ankle x-rays with severe degenerative arthritis  No active synovitis on joint exam.  Joint symptoms most likely due to osteoarthritis. Continue leflunomide 20 mg daily and prednisone 5 mg daily. Not willing to take biologic DMARD therapy due to concern for side effects  Did not tolerate methotrexate  [headache and leg swelling]  No improvement with hydroxychloroquine    High risk medication use-  Leflunomide can cause nausea/vomiting, diarrhea, elevated liver enzymes, bone marrow toxicity, rare cases of interstitial lung diseases, birth defects and were explained in detail to the patient. We have to check blood work every month for first 3 months and then every 2-3 months    Primary osteoarthritis involving multiple joints-  Continues to be symptomatic.   Advised to take meloxicam 7.5 mg daily  Tylenol 1000 mg every 8 hours, do not exceed 3 grams daily    Paraffin wax bath  Hand exercises    Gout-  No tophi on exam.    No new flareups of gout. Last uric acid 4.1. Continue allopurinol 300 mg daily   Off of colchicine    Positive AMADOU- positive AMADOU 1: 160 speckled pattern. Repeat RNP antibody negative. dsDNA, anti-Sampson, SSA/SSB, anticentromere, SCL 70 -. No signs and symptoms concerning for lupus      The patient indicates understanding of these issues and agrees with the plan. Return in about 3 months (around 3/6/2023). The risks and benefits of my recommendations, as well as other treatment options, benefits and side effects werediscussed with the patient. All questions were answered. MEDICATIONS  Current Outpatient Medications   Medication Sig Dispense Refill    gabapentin (NEURONTIN) 100 MG capsule       meloxicam (MOBIC) 15 MG tablet Take 1 tablet by mouth daily 30 tablet 2    leflunomide (ARAVA) 20 MG tablet Take 1 tablet by mouth daily 90 tablet 0    predniSONE (DELTASONE) 5 MG tablet Take 1 tablet by mouth daily 30 tablet 2    bisacodyl (DULCOLAX) 5 MG EC tablet Please follow directions for colonoscopy as per the letter sent to you. polyethylene glycol (GLYCOLAX) 17 GM/SCOOP powder Please follow directions for colonoscopy as per the letter sent to you. amLODIPine (NORVASC) 5 MG tablet       levothyroxine (SYNTHROID) 50 MCG tablet       Multiple Minerals (CALCIUM-MAGNESIUM-ZINC) TABS       KLOR-CON M20 20 MEQ extended release tablet       fluticasone (ARNUITY ELLIPTA) 100 MCG/ACT AEPB Inhale 1 puff into the lungs daily      allopurinol (ZYLOPRIM) 300 MG tablet Take 300 mg by mouth daily      sertraline (ZOLOFT) 25 MG tablet Take 25 mg by mouth daily      Multiple Vitamins-Minerals (THERAPEUTIC MULTIVITAMIN-MINERALS) tablet Take 1 tablet by mouth daily      hydroCHLOROthiazide (HYDRODIURIL) 25 MG tablet Take 25 mg by mouth daily       No current facility-administered medications for this visit.        ALLERGIES  Allergies   Allergen Reactions Latex     Duloxetine Rash    Bactrim [Sulfamethoxazole-Trimethoprim]     Sulfa Antibiotics          Comments  No specialty comments available. Background history:  Keira Loera is a 67 y.o. female with past medical history of depression, gout, hypertension who is being seen for follow up evaluation of  joint pain. She reports pain in her joints for past several years. Pain travels around from 1 joint to another. Pain is mainly located in her hands, wrists, knees and ankles. She has swelling in the joints mainly located in the knuckles and ankles. She has morning stiffness lasting for 30 minutes. She has difficulty opening doorknobs and jars. She denies dactylitis, enthesitis, tenosynovitis, uveitis, inflammatory back pain or inflammatory bowel disease. She denies family history of rheumatoid arthritis. She denies fever, weight loss or swollen glands. She has tried taking ibuprofen/Aleve as needed in the past with some relief. She had episode of pain, swelling, erythema and tenderness of the left ankle involving the left foot. She was given a course of antibiotics for possible infection. Symptoms lasted for 2 weeks. 1 month later she had another episode involving the right wrist, right hand and right elbow. She had swelling, erythema, pain and tenderness. She went to the [facility] ER. She had right hand, right wrist and right elbow x-ray which showed joint effusion, degenerative changes. She was given ibuprofen, codeine and steroids. Symptoms lasted for 1 week. She had blood work that showed elevated uric acid of 8.7. She was placed on allopurinol 300 mg daily and colchicine 0.6 mg daily. She has not had any flareups since. She had a work-up by PCP that showed positive AMADOU 1: 160 speckled pattern and positive RNP antibody. dsDNA, anti-Sampson, SSA/SSB, SCL 70 and anticentromere, RF came back negative.   She denies malar rash, photosensitivity, nasal ulcers, dry eyes, alopecia, history OF pregnancy complications, blood clots, pleurisy or pericarditis, Raynaud's, sclerodactyly or skin thickening. Data reviewed-ER records were reviewed from September 2021 as mentioned in HPI    Interim history: She presents for a follow-up of seronegative rheumatoid arthritis, gout and osteoarthritis. She did not tolerate methotrexate and meloxicam due to headache and leg swelling. No improvement with Plaquenil. She is currently on leflunomide 20 mg daily and prednisone 5 mg daily. She also takes meloxicam 7.5 mg daily. She reports improvement in joint pain, swelling and stiffness except with weather changes. She feels more achy around her shoulders and hands with extremes of weather. Morning stiffness last for 30 minutes. She denies any swelling in the joints. She denies side effects with leflunomide. She denies recent fevers or infections. She denies psoriasis, inflammatory bowel disease, inflammatory back pain, dactylitis, enthesitis, tenosynovitis and uveitis. Blood work showed negative RF, CCP, hepatitis panel. ESR, CRP are both normal.  Hand, foot, ankle x-rays showed severe degenerative changes  She has not had any flareup of gout since last seen. She is on allopurinol 300 mg daily. She is off of colchicine. Repeat RNP came back negative. She has positive AMADOU 1: 160 speckled pattern. dsDNA, anti-Sampson, RNP, SSA/SSB are negative    HPI  Review of Systems    REVIEW OF SYSTEMS: Positive for fatigue, canker sores, dry mouth, 1 miscarriage  Constitutional: No unanticipated weight loss or fevers. Integumentary: No rash, photosensitivity, malar rash, livedo reticularis, alopecia and Raynaud's symptoms, sclerodactyly, skin tightening  Eyes: negative for visual disturbance and persistent redness, discharge from eyes   ENT: - No tinnitus, loss of hearing, vertigo, or recurrent ear infections.  - No history of nasal ulcers.   - No history of dry eyes  Cardiovascular: No history of pericarditis, chest pain or murmur or palpitations  Respiratory: No shortness of breath, cough or history of interstitial lung disease. No history of pleurisy. No history of tuberculosis or atypical infections. Gastrointestinal: No history of heart burn, dysphagia or esophageal dysmotility. No change in bowel habits or any inflammatory bowel disease. Genitourinary: No history of renal disease, miscarriages. Hematologic/Lymphatic: No abnormal bruising or bleeding, blood clots or swollen lymph nodes. Neurological: No history of  seizure or focal weakness. No history of neuropathies, paresthesias or hyperesthesias, facial droop, diplopia  Psychiatric: No history of bipolar disease  Endocrine: Denies any polyuria, polydipsia and osteoporosis  Allergic/Immunologic: No nasal congestion or hives. I have reviewed patients Past medical History, Social History and Family History as mentioned in her chart and this remains unchanged fromprevious. Past Medical History:   Diagnosis Date    Acute headache     Arthritis     Breast cancer ([facility] [address]     Depression     Gout     Hypertension     Joint pain     Joint swelling     Muscle spasm      Past Surgical History:   Procedure Laterality Date    BREAST LUMPECTOMY      [facility]     Social History     Socioeconomic History    Marital status:       Spouse name: Not on file    Number of children: Not on file    Years of education: Not on file    Highest education level: Not on file   Occupational History    Not on file   Tobacco Use    Smoking status: Never     Passive exposure: Yes    Smokeless tobacco: Never    Tobacco comments:      used to smoke   Vaping Use    Vaping Use: Never used   Substance and Sexual Activity    Alcohol use: Yes     Comment: Rarely    Drug use: Never    Sexual activity: Not on file   Other Topics Concern    Not on file   Social History Narrative    Not on file     Social Determinants of Health     Financial Resource Strain: Not on file   Food Insecurity: Not on file   Transportation Needs: Not on file   Physical Activity: Not on file   Stress: Not on file   Social Connections: Not on file   Intimate Partner Violence: Not on file   Housing Stability: Not on file     No family history on file. PHYSICAL EXAM   Vitals:    12/06/22 1012   BP: 120/70   Site: Left Upper Arm   Position: Sitting   Cuff Size: Large Adult   Weight: 164 lb (74.4 kg)     Physical Exam  Constitutional:  Well developed, well nourished, no acute distress, non-toxic appearance   Musculoskeletal:    RIGHT  Swell  Tender  ROM  LEFT  Swell  Tender  ROM    DIP2  0  0   [place]  0  0  FULL    DIP3  0  0  FULL   0  0  FULL    DIP4  0  0  FULL   0  0  FULL    DIP5  0  0  FULL   0  0  FULL    PIP1  0 0 FULL   0  0  FULL    PIP2  0 0 Bony change  0 0 Bony change   PIP3  0 0 Bony change  0 0 Bony change   PIP4  0 0 Bony change  0 0 Bony change   PIP5  0 0 Bony change  0 0 Bony change   MCP1  0 0 FULL   0 0 FULL    MCP2  0 0 Bony change  0 0 Bony change   MCP3  0 0 Bony change  0 0 Bony change   MCP4  0 0 FULL   0 0 FULL    MCP5  0 0 FULL   0 0 FULL    Wrist  0  0  FULL   0  0  FULL    Elbow  0  0  FULL   0  0  FULL    Shouldr  0  0  FULL   0  0  FULL    Hip  0  0  FULL   0  0  FULL    Knee  0  0   crepitus  0  0   crepitus   Ankle  0 0 decr/bony change  0  0  FULL    MTP1  0  0   hallux valgus  0  0   hallux valgus   MTP2  0  + FULL   0  + FULL    MTP3  0  + FULL   0  + FULL    MTP4  0  + FULL   0  + FULL    MTP5  0  + FULL   0  + FULL    IP1  0  0  FULL   0  0  FULL    IP2  0  0  FULL   0  0  FULL    IP3  0  0  FULL   0  0  FULL    IP4  0  0  FULL   0  0  FULL    IP5  0  0  FULL   0 0 FULL     Squaring and bony enlargement of bilateral CMC joints  Ambulates without assistance, normal gait  Neck: Full ROM, no tenderness,supple   Back- no tenderness.   Eyes:  PERRL, extra ocular movements intact, conjunctiva normal   HEENT: Atraumatic, normocephalic, external ears normal, oropharynx moist, no pharyngeal exudates. Respiratory:  No respiratory distress  GI:  Soft, nondistended, normal bowel sounds, nontender, noorganomegaly, no mass, no rebound, no guarding   :  No costovertebral angle tenderness   Integument:  Well hydrated, no rash or telangiectasias  Lymphatic:  No lymphadenopathy noted   Neurologic:   Alert & oriented x 3, CN 2-12 normal, no focal deficits noted. Sensations Intact. Muscle strength 5/5 proximally and distally in upper and lower extremities.    Psychiatric:  Speech and behavior appropriate           LABS AND IMAGING  Outside data reviewed and in HPI    Lab Results   Component Value Date/Time    WBC 7.7 11/15/2022 08:43 AM    RBC 3.96 11/15/2022 08:43 AM    HGB 12.3 11/15/2022 08:43 AM    HCT 38.1 11/15/2022 08:43 AM     11/15/2022 08:43 AM    MCV 96.4 11/15/2022 08:43 AM    MCH 31.2 11/15/2022 08:43 AM    MCHC 32.4 11/15/2022 08:43 AM    RDW 15.5 11/15/2022 08:43 AM    LYMPHOPCT 22.9 11/15/2022 08:43 AM    MONOPCT 9.2 11/15/2022 08:43 AM    BASOPCT 1.8 11/15/2022 08:43 AM    MONOSABS 0.7 11/15/2022 08:43 AM    LYMPHSABS 1.8 11/15/2022 08:43 AM    EOSABS 0.3 11/15/2022 08:43 AM    BASOSABS 0.1 11/15/2022 08:43 AM       Chemistry        Component Value Date/Time     12/09/2021 0925    K 3.7 12/09/2021 0925    K 3.3 (L) 07/02/2021 1740    CL 99 12/09/2021 0925    CO2 26 12/09/2021 0925    BUN 18 12/09/2021 0925    CREATININE 1.0 11/15/2022 0843        Component Value Date/Time    CALCIUM 9.9 12/09/2021 0925    ALKPHOS 104 12/09/2021 0925    AST 17 11/15/2022 0843    ALT 21 11/15/2022 0843    BILITOT 0.5 12/09/2021 0925          Lab Results   Component Value Date    SEDRATE 7 12/09/2021     Lab Results   Component Value Date    CRP <3.0 12/09/2021     Lab Results   Component Value Date/Time    AMADOU POSITIVE 09/07/2021 03:09 PM     Lab Results   Component Value Date/Time    RF 11.0 09/07/2021 03:09 PM     Lab Results   Component Value Date/Time    AMADOU POSITIVE 09/07/2021 03:09 PM    ANATITER 1:160 09/07/2021 03:09 PM     No results found for: DSDNAG, DSDNAIGGIFA  No results found for: Dianne Ramoser  No results found for: SMAB, RNPAB  No results found for: CENTABIGG  No results found for: C3, C4, ACE  Lab Results   Component Value Date/Time    VITD25 44.6 12/09/2021 09:25 AM     Lab Results   Component Value Date    LABURIC 4.1 12/09/2021     Lab Results   Component Value Date    WQNTOPDX99 726 12/09/2021     Lab Results   Component Value Date    TSHFT4 0.18 (L) 12/09/2021    TSH 1.46 06/28/2021     Lab Results   Component Value Date    VITD25 44.6 12/09/2021       Radiology: Bilateral hand, foot, ankle x-rays 12/9/2021  Right hand:       1. Moderate 1st CMC joint osteoarthrosis. 2. No acute fracture or dislocation. 3. Subcortical cystic changes at the ulnar lunate can be seen with ulnocarpal   abutment syndrome. Left hand:       1. Moderate to severe 1st CMC joint osteoarthrosis. 2. No acute fracture or dislocation. Right ankle:       1. Severe degenerative changes of the tibiotalar joint. Clockwise rotation   of the talar dome with irregularity. Moderate plantar calcaneal spur. Moderate degenerative changes of the midfoot. Remote inferolateral malleolus   avulsion fracture fragment. 2. No acute fracture or dislocation. Right foot:       1. Moderate diffuse degenerative changes as detailed above. Diffuse   osteopenia. Moderate plantar calcaneal spur. Severe degenerative changes of   the tibiotalar joint. Findings at the midfoot and TMT joints could represent   early neuropathic/Charcot related changes. 2. No acute fracture or dislocation. Left ankle:       1. Mild-to-moderate degenerative changes as detailed above. 2. No acute fracture or dislocation. Left foot:       1. Mild-to-moderate degenerative changes as detailed above.   Findings at the   midfoot/TMT joints could represent early neuropathic/Charcot related changes. 2. No acute fracture or dislocation. ######################################################################    I thank you for giving me theopportunity to participate in [facility]. If you have any questions or concerns please feel free to contact me. I look forward to following  Pamela along with you. Electronically signed by: Martha Guzman MD, MD, 12/6/2022 10:41 AM    Documentation was done using voice recognition dragon software. Every effort was made to ensure accuracy;however, inadvertent unintentional computerized transcription errors may be present.

## 2022-12-06 NOTE — PATIENT INSTRUCTIONS
Tylenol 1000 mg every 8 hours, do not exceed 3 grams daily    Paraffin wax bath   Continue meloxicam, arava and prednisone

## 2022-12-09 ENCOUNTER — HOSPITAL ENCOUNTER (OUTPATIENT)
Dept: GENERAL RADIOLOGY | Age: 72
Discharge: HOME | End: 2022-12-09
Payer: MEDICARE

## 2022-12-09 DIAGNOSIS — N95.8 POSTARTIFICIAL MENOPAUSAL SYNDROME: ICD-10-CM

## 2022-12-09 DIAGNOSIS — N95.9 MENOPAUSAL PROBLEM: ICD-10-CM

## 2022-12-09 PROCEDURE — 77080 DXA BONE DENSITY AXIAL: CPT

## 2022-12-30 DIAGNOSIS — Z79.899 HIGH RISK MEDICATION USE: ICD-10-CM

## 2022-12-30 DIAGNOSIS — M06.00 SERONEGATIVE RHEUMATOID ARTHRITIS (HCC): ICD-10-CM

## 2022-12-30 LAB
ALT SERPL-CCNC: 14 U/L (ref 10–40)
AST SERPL-CCNC: 16 U/L (ref 15–37)
BASOPHILS ABSOLUTE: 0.1 K/UL (ref 0–0.2)
BASOPHILS RELATIVE PERCENT: 1.1 %
CREAT SERPL-MCNC: 0.8 MG/DL (ref 0.6–1.2)
EOSINOPHILS ABSOLUTE: 0.5 K/UL (ref 0–0.6)
EOSINOPHILS RELATIVE PERCENT: 5.9 %
GFR SERPL CREATININE-BSD FRML MDRD: >60 ML/MIN/{1.73_M2}
HCT VFR BLD CALC: 37.6 % (ref 36–48)
HEMOGLOBIN: 12 G/DL (ref 12–16)
LYMPHOCYTES ABSOLUTE: 1.1 K/UL (ref 1–5.1)
LYMPHOCYTES RELATIVE PERCENT: 14.3 %
MCH RBC QN AUTO: 30.8 PG (ref 26–34)
MCHC RBC AUTO-ENTMCNC: 31.9 G/DL (ref 31–36)
MCV RBC AUTO: 96.4 FL (ref 80–100)
MONOCYTES ABSOLUTE: 0.7 K/UL (ref 0–1.3)
MONOCYTES RELATIVE PERCENT: 9.1 %
NEUTROPHILS ABSOLUTE: 5.4 K/UL (ref 1.7–7.7)
NEUTROPHILS RELATIVE PERCENT: 69.6 %
PDW BLD-RTO: 14.3 % (ref 12.4–15.4)
PLATELET # BLD: 280 K/UL (ref 135–450)
PMV BLD AUTO: 8.7 FL (ref 5–10.5)
RBC # BLD: 3.9 M/UL (ref 4–5.2)
WBC # BLD: 7.8 K/UL (ref 4–11)

## 2023-01-30 DIAGNOSIS — Z79.899 HIGH RISK MEDICATION USE: ICD-10-CM

## 2023-01-30 DIAGNOSIS — M06.00 SERONEGATIVE RHEUMATOID ARTHRITIS (HCC): ICD-10-CM

## 2023-01-30 LAB
ALT SERPL-CCNC: 19 U/L (ref 10–40)
AST SERPL-CCNC: 18 U/L (ref 15–37)
BASOPHILS ABSOLUTE: 0.1 K/UL (ref 0–0.2)
BASOPHILS RELATIVE PERCENT: 1.7 %
CREAT SERPL-MCNC: 0.8 MG/DL (ref 0.6–1.2)
EOSINOPHILS ABSOLUTE: 0.5 K/UL (ref 0–0.6)
EOSINOPHILS RELATIVE PERCENT: 6.3 %
GFR SERPL CREATININE-BSD FRML MDRD: >60 ML/MIN/{1.73_M2}
HCT VFR BLD CALC: 38.4 % (ref 36–48)
HEMOGLOBIN: 12.3 G/DL (ref 12–16)
LYMPHOCYTES ABSOLUTE: 1.9 K/UL (ref 1–5.1)
LYMPHOCYTES RELATIVE PERCENT: 24.1 %
MCH RBC QN AUTO: 30.7 PG (ref 26–34)
MCHC RBC AUTO-ENTMCNC: 32 G/DL (ref 31–36)
MCV RBC AUTO: 96 FL (ref 80–100)
MONOCYTES ABSOLUTE: 0.8 K/UL (ref 0–1.3)
MONOCYTES RELATIVE PERCENT: 10 %
NEUTROPHILS ABSOLUTE: 4.5 K/UL (ref 1.7–7.7)
NEUTROPHILS RELATIVE PERCENT: 57.9 %
PDW BLD-RTO: 14.7 % (ref 12.4–15.4)
PLATELET # BLD: 285 K/UL (ref 135–450)
PMV BLD AUTO: 9.1 FL (ref 5–10.5)
RBC # BLD: 4 M/UL (ref 4–5.2)
WBC # BLD: 7.8 K/UL (ref 4–11)

## 2023-03-10 DIAGNOSIS — M06.00 SERONEGATIVE RHEUMATOID ARTHRITIS (HCC): ICD-10-CM

## 2023-03-10 RX ORDER — LEFLUNOMIDE 20 MG/1
TABLET ORAL
Qty: 90 TABLET | Refills: 0 | OUTPATIENT
Start: 2023-03-10

## 2023-03-10 RX ORDER — LEFLUNOMIDE 20 MG/1
20 TABLET ORAL DAILY
Qty: 90 TABLET | Refills: 0 | Status: SHIPPED | OUTPATIENT
Start: 2023-03-10

## 2023-03-13 DIAGNOSIS — M06.00 SERONEGATIVE RHEUMATOID ARTHRITIS (HCC): ICD-10-CM

## 2023-03-13 DIAGNOSIS — Z79.899 HIGH RISK MEDICATION USE: ICD-10-CM

## 2023-03-13 LAB
ALT SERPL-CCNC: 19 U/L (ref 10–40)
AST SERPL-CCNC: 17 U/L (ref 15–37)
BASOPHILS ABSOLUTE: 0.1 K/UL (ref 0–0.2)
BASOPHILS RELATIVE PERCENT: 1.8 %
CREAT SERPL-MCNC: 0.8 MG/DL (ref 0.6–1.2)
EOSINOPHILS ABSOLUTE: 0.7 K/UL (ref 0–0.6)
EOSINOPHILS RELATIVE PERCENT: 8.1 %
GFR SERPL CREATININE-BSD FRML MDRD: >60 ML/MIN/{1.73_M2}
HCT VFR BLD CALC: 37.3 % (ref 36–48)
HEMOGLOBIN: 12.3 G/DL (ref 12–16)
LYMPHOCYTES ABSOLUTE: 2 K/UL (ref 1–5.1)
LYMPHOCYTES RELATIVE PERCENT: 24.6 %
MCH RBC QN AUTO: 31 PG (ref 26–34)
MCHC RBC AUTO-ENTMCNC: 33 G/DL (ref 31–36)
MCV RBC AUTO: 93.9 FL (ref 80–100)
MONOCYTES ABSOLUTE: 0.8 K/UL (ref 0–1.3)
MONOCYTES RELATIVE PERCENT: 10.5 %
NEUTROPHILS ABSOLUTE: 4.4 K/UL (ref 1.7–7.7)
NEUTROPHILS RELATIVE PERCENT: 55 %
PDW BLD-RTO: 14.8 % (ref 12.4–15.4)
PLATELET # BLD: 274 K/UL (ref 135–450)
PMV BLD AUTO: 9.2 FL (ref 5–10.5)
RBC # BLD: 3.97 M/UL (ref 4–5.2)
WBC # BLD: 8.1 K/UL (ref 4–11)

## 2023-03-22 ENCOUNTER — OFFICE VISIT (OUTPATIENT)
Dept: RHEUMATOLOGY | Age: 73
End: 2023-03-22
Payer: MEDICARE

## 2023-03-22 VITALS — WEIGHT: 175.6 LBS | SYSTOLIC BLOOD PRESSURE: 130 MMHG | DIASTOLIC BLOOD PRESSURE: 80 MMHG | BODY MASS INDEX: 31.11 KG/M2

## 2023-03-22 DIAGNOSIS — M15.9 PRIMARY OSTEOARTHRITIS INVOLVING MULTIPLE JOINTS: ICD-10-CM

## 2023-03-22 DIAGNOSIS — M06.00 SERONEGATIVE RHEUMATOID ARTHRITIS (HCC): ICD-10-CM

## 2023-03-22 PROCEDURE — 3075F SYST BP GE 130 - 139MM HG: CPT | Performed by: INTERNAL MEDICINE

## 2023-03-22 PROCEDURE — 1123F ACP DISCUSS/DSCN MKR DOCD: CPT | Performed by: INTERNAL MEDICINE

## 2023-03-22 PROCEDURE — 99214 OFFICE O/P EST MOD 30 MIN: CPT | Performed by: INTERNAL MEDICINE

## 2023-03-22 PROCEDURE — 3079F DIAST BP 80-89 MM HG: CPT | Performed by: INTERNAL MEDICINE

## 2023-03-22 RX ORDER — ALLOPURINOL 100 MG/1
200 TABLET ORAL DAILY
COMMUNITY
Start: 2023-02-20

## 2023-03-22 RX ORDER — PREDNISONE 1 MG/1
TABLET ORAL
Qty: 45 TABLET | Refills: 1 | Status: SHIPPED | OUTPATIENT
Start: 2023-03-22

## 2023-03-22 RX ORDER — MELOXICAM 7.5 MG/1
7.5 TABLET ORAL DAILY
Qty: 90 TABLET | Refills: 1 | Status: SHIPPED | OUTPATIENT
Start: 2023-03-22

## 2023-03-22 RX ORDER — LEFLUNOMIDE 20 MG/1
20 TABLET ORAL DAILY
Qty: 90 TABLET | Refills: 0 | Status: SHIPPED | OUTPATIENT
Start: 2023-03-22

## 2023-03-22 RX ORDER — MONTELUKAST SODIUM 10 MG/1
10 TABLET ORAL NIGHTLY
COMMUNITY
Start: 2023-03-08

## 2023-03-22 NOTE — PROGRESS NOTES
osteoarthritis. She did not tolerate methotrexate due to headache and leg swelling. No improvement with Plaquenil. She is currently on leflunomide 20 mg daily and prednisone 5 mg daily. She also takes meloxicam 7.5 mg daily. She reports improvement in joint pain, swelling and stiffness except with extremes of weather. Morning stiffness last for 30 minutes. She denies any swelling in the joints. She denies side effects with leflunomide. She is having recurrent upper respiratory tract infection. She denies psoriasis, inflammatory bowel disease, inflammatory back pain, dactylitis, enthesitis, tenosynovitis and uveitis. Blood work showed negative RF, CCP, hepatitis panel. ESR, CRP are both normal.  Hand, foot, ankle x-rays showed severe degenerative changes  She has not had any flareup of gout since last seen. She is on allopurinol 200 mg daily. She is off of colchicine. Repeat RNP came back negative. She has positive AMADOU 1: 160 speckled pattern. dsDNA, anti-Sampson, RNP, SSA/SSB are negative    HPI  Review of Systems    REVIEW OF SYSTEMS: Positive for fatigue, canker sores, dry mouth, 1 miscarriage  Constitutional: No unanticipated weight loss or fevers. Integumentary: No rash, photosensitivity, malar rash, livedo reticularis, alopecia and Raynaud's symptoms, sclerodactyly, skin tightening  Eyes: negative for visual disturbance and persistent redness, discharge from eyes   ENT: - No tinnitus, loss of hearing, vertigo, or recurrent ear infections.  - No history of nasal ulcers. - No history of dry eyes  Cardiovascular: No history of pericarditis, chest pain or murmur or palpitations  Respiratory: No shortness of breath, cough or history of interstitial lung disease. No history of pleurisy. No history of tuberculosis or atypical infections. Gastrointestinal: No history of heart burn, dysphagia or esophageal dysmotility. No change in bowel habits or any inflammatory bowel disease.   Genitourinary: No

## 2023-06-12 DIAGNOSIS — M06.00 SERONEGATIVE RHEUMATOID ARTHRITIS (HCC): ICD-10-CM

## 2023-06-12 DIAGNOSIS — Z79.899 HIGH RISK MEDICATION USE: ICD-10-CM

## 2023-06-12 LAB
ALT SERPL-CCNC: 19 U/L (ref 10–40)
AST SERPL-CCNC: 19 U/L (ref 15–37)
BASOPHILS # BLD: 0.2 K/UL (ref 0–0.2)
BASOPHILS NFR BLD: 2.3 %
CREAT SERPL-MCNC: 0.7 MG/DL (ref 0.6–1.2)
DEPRECATED RDW RBC AUTO: 14.6 % (ref 12.4–15.4)
EOSINOPHIL # BLD: 0.5 K/UL (ref 0–0.6)
EOSINOPHIL NFR BLD: 7.9 %
GFR SERPLBLD CREATININE-BSD FMLA CKD-EPI: >60 ML/MIN/{1.73_M2}
HCT VFR BLD AUTO: 35.4 % (ref 36–48)
HGB BLD-MCNC: 12 G/DL (ref 12–16)
LYMPHOCYTES # BLD: 1.6 K/UL (ref 1–5.1)
LYMPHOCYTES NFR BLD: 24.4 %
MCH RBC QN AUTO: 32.6 PG (ref 26–34)
MCHC RBC AUTO-ENTMCNC: 33.8 G/DL (ref 31–36)
MCV RBC AUTO: 96.7 FL (ref 80–100)
MONOCYTES # BLD: 0.7 K/UL (ref 0–1.3)
MONOCYTES NFR BLD: 10.5 %
NEUTROPHILS # BLD: 3.6 K/UL (ref 1.7–7.7)
NEUTROPHILS NFR BLD: 54.9 %
PLATELET # BLD AUTO: 253 K/UL (ref 135–450)
PMV BLD AUTO: 8.9 FL (ref 5–10.5)
RBC # BLD AUTO: 3.66 M/UL (ref 4–5.2)
WBC # BLD AUTO: 6.6 K/UL (ref 4–11)

## 2023-08-17 ENCOUNTER — PROCEDURE VISIT (OUTPATIENT)
Dept: NEUROLOGY | Age: 73
End: 2023-08-17
Payer: MEDICARE

## 2023-08-17 DIAGNOSIS — R20.0 BILATERAL HAND NUMBNESS: Primary | ICD-10-CM

## 2023-08-17 PROCEDURE — 95911 NRV CNDJ TEST 9-10 STUDIES: CPT | Performed by: PSYCHIATRY & NEUROLOGY

## 2023-08-17 PROCEDURE — 95886 MUSC TEST DONE W/N TEST COMP: CPT | Performed by: PSYCHIATRY & NEUROLOGY

## 2023-08-17 NOTE — PROGRESS NOTES
Luis Daniel Marroquin M.D. [facility]) [facility]  Board Certified in [address], [address] [facility], [address] [place] [address]    EMG / NERVE CONDUCTION STUDY      PATIENT:  Vicki Larios       DATE OF EM23     YOB: 1950       REASON FOR EMG:   Bilateral hand numbness      REFERRING PHYSICIAN:  Tin Felix MD  [facility],  [facility] [address]     SUMMARY:   Bilateral median sensory nerve studies with prolonged distal latencies. Bilateral median motor nerve studies were normal.  Bilateral ulnar sensory nerve studies were normal.  The left radial sensory nerve study was normal.  The left ulnar motor nerve study had a moderately severe slowing of conduction velocity across the elbow. The right ulnar motor nerve study was normal.  Needle EMG of several muscles in both upper extremities was normal.    CLINICAL DIAGNOSIS:  Carpal tunnel syndrome        EMG RESULTS:     1. This patient has mild bilateral median nerve lesions at the wrist.  (Carpal tunnel syndrome. ). Both sides are approximately equally affected. 2.  This patient also has a moderately severe left ulnar nerve lesion at the elbow.        ---------------------------------------------  Luis Daniel Marroquin M.D.   Electromyographer / Neurologist

## 2024-04-18 ENCOUNTER — HOSPITAL ENCOUNTER (OUTPATIENT)
Dept: GENERAL RADIOLOGY | Age: 74
Discharge: HOME | End: 2024-04-18
Payer: COMMERCIAL

## 2024-04-18 ENCOUNTER — HOSPITAL ENCOUNTER (OUTPATIENT)
Age: 74
Discharge: HOME | End: 2024-04-18
Payer: COMMERCIAL

## 2024-04-18 DIAGNOSIS — R52 PAIN: ICD-10-CM

## 2024-04-18 PROCEDURE — 71046 X-RAY EXAM CHEST 2 VIEWS: CPT
